# Patient Record
Sex: MALE | Race: WHITE | HISPANIC OR LATINO | Employment: FULL TIME | URBAN - METROPOLITAN AREA
[De-identification: names, ages, dates, MRNs, and addresses within clinical notes are randomized per-mention and may not be internally consistent; named-entity substitution may affect disease eponyms.]

---

## 2017-01-23 ENCOUNTER — ALLSCRIPTS OFFICE VISIT (OUTPATIENT)
Dept: OTHER | Facility: OTHER | Age: 52
End: 2017-01-23

## 2017-02-10 ENCOUNTER — ALLSCRIPTS OFFICE VISIT (OUTPATIENT)
Dept: OTHER | Facility: OTHER | Age: 52
End: 2017-02-10

## 2017-02-10 DIAGNOSIS — E11.9 TYPE 2 DIABETES MELLITUS WITHOUT COMPLICATIONS (HCC): ICD-10-CM

## 2017-02-10 LAB — HBA1C MFR BLD HPLC: 5.9 %

## 2017-02-15 ENCOUNTER — APPOINTMENT (OUTPATIENT)
Dept: LAB | Facility: HOSPITAL | Age: 52
End: 2017-02-15
Payer: COMMERCIAL

## 2017-02-15 DIAGNOSIS — E11.9 TYPE 2 DIABETES MELLITUS WITHOUT COMPLICATIONS (HCC): ICD-10-CM

## 2017-02-15 LAB
ALBUMIN SERPL BCP-MCNC: 3.6 G/DL (ref 3.5–5)
ALP SERPL-CCNC: 68 U/L (ref 46–116)
ALT SERPL W P-5'-P-CCNC: 31 U/L (ref 12–78)
ANION GAP SERPL CALCULATED.3IONS-SCNC: 7 MMOL/L (ref 4–13)
AST SERPL W P-5'-P-CCNC: 19 U/L (ref 5–45)
BASOPHILS # BLD AUTO: 0.01 THOUSANDS/ΜL (ref 0–0.1)
BASOPHILS NFR BLD AUTO: 0 % (ref 0–1)
BILIRUB SERPL-MCNC: 0.32 MG/DL (ref 0.2–1)
BUN SERPL-MCNC: 16 MG/DL (ref 5–25)
CALCIUM SERPL-MCNC: 8.3 MG/DL (ref 8.3–10.1)
CHLORIDE SERPL-SCNC: 103 MMOL/L (ref 100–108)
CHOLEST SERPL-MCNC: 191 MG/DL (ref 50–200)
CO2 SERPL-SCNC: 30 MMOL/L (ref 21–32)
CREAT SERPL-MCNC: 0.85 MG/DL (ref 0.6–1.3)
CREAT UR-MCNC: 174 MG/DL
EOSINOPHIL # BLD AUTO: 0.17 THOUSAND/ΜL (ref 0–0.61)
EOSINOPHIL NFR BLD AUTO: 3 % (ref 0–6)
ERYTHROCYTE [DISTWIDTH] IN BLOOD BY AUTOMATED COUNT: 13.2 % (ref 11.6–15.1)
GFR SERPL CREATININE-BSD FRML MDRD: >60 ML/MIN/1.73SQ M
GLUCOSE SERPL-MCNC: 129 MG/DL (ref 65–140)
HCT VFR BLD AUTO: 40.7 % (ref 36.5–49.3)
HDLC SERPL-MCNC: 43 MG/DL (ref 40–60)
HGB BLD-MCNC: 14.5 G/DL (ref 12–17)
LDLC SERPL CALC-MCNC: 125 MG/DL (ref 0–100)
LYMPHOCYTES # BLD AUTO: 1.77 THOUSANDS/ΜL (ref 0.6–4.47)
LYMPHOCYTES NFR BLD AUTO: 34 % (ref 14–44)
MCH RBC QN AUTO: 30.7 PG (ref 26.8–34.3)
MCHC RBC AUTO-ENTMCNC: 35.6 G/DL (ref 31.4–37.4)
MCV RBC AUTO: 86 FL (ref 82–98)
MICROALBUMIN UR-MCNC: 10.1 MG/L (ref 0–20)
MICROALBUMIN/CREAT 24H UR: 6 MG/G CREATININE (ref 0–30)
MONOCYTES # BLD AUTO: 0.42 THOUSAND/ΜL (ref 0.17–1.22)
MONOCYTES NFR BLD AUTO: 8 % (ref 4–12)
NEUTROPHILS # BLD AUTO: 2.92 THOUSANDS/ΜL (ref 1.85–7.62)
NEUTS SEG NFR BLD AUTO: 55 % (ref 43–75)
NRBC BLD AUTO-RTO: 0 /100 WBCS
PLATELET # BLD AUTO: 221 THOUSANDS/UL (ref 149–390)
PMV BLD AUTO: 11 FL (ref 8.9–12.7)
POTASSIUM SERPL-SCNC: 3.7 MMOL/L (ref 3.5–5.3)
PROT SERPL-MCNC: 7.3 G/DL (ref 6.4–8.2)
RBC # BLD AUTO: 4.72 MILLION/UL (ref 3.88–5.62)
SODIUM SERPL-SCNC: 140 MMOL/L (ref 136–145)
TRIGL SERPL-MCNC: 114 MG/DL
WBC # BLD AUTO: 5.29 THOUSAND/UL (ref 4.31–10.16)

## 2017-02-15 PROCEDURE — 80061 LIPID PANEL: CPT

## 2017-02-15 PROCEDURE — 82043 UR ALBUMIN QUANTITATIVE: CPT

## 2017-02-15 PROCEDURE — 80053 COMPREHEN METABOLIC PANEL: CPT

## 2017-02-15 PROCEDURE — 85025 COMPLETE CBC W/AUTO DIFF WBC: CPT

## 2017-02-15 PROCEDURE — 82570 ASSAY OF URINE CREATININE: CPT

## 2017-02-15 PROCEDURE — 36415 COLL VENOUS BLD VENIPUNCTURE: CPT

## 2017-09-21 ENCOUNTER — ALLSCRIPTS OFFICE VISIT (OUTPATIENT)
Dept: OTHER | Facility: OTHER | Age: 52
End: 2017-09-21

## 2017-09-21 DIAGNOSIS — E11.9 TYPE 2 DIABETES MELLITUS WITHOUT COMPLICATIONS (HCC): ICD-10-CM

## 2017-09-21 DIAGNOSIS — Z12.5 ENCOUNTER FOR SCREENING FOR MALIGNANT NEOPLASM OF PROSTATE: ICD-10-CM

## 2017-09-21 LAB — HBA1C MFR BLD HPLC: 6.1 %

## 2017-09-28 ENCOUNTER — APPOINTMENT (OUTPATIENT)
Dept: LAB | Facility: HOSPITAL | Age: 52
End: 2017-09-28
Payer: COMMERCIAL

## 2017-09-28 DIAGNOSIS — Z12.5 ENCOUNTER FOR SCREENING FOR MALIGNANT NEOPLASM OF PROSTATE: ICD-10-CM

## 2017-09-28 DIAGNOSIS — E11.9 TYPE 2 DIABETES MELLITUS WITHOUT COMPLICATIONS (HCC): ICD-10-CM

## 2017-09-28 LAB
ALBUMIN SERPL BCP-MCNC: 3.7 G/DL (ref 3.5–5)
ALP SERPL-CCNC: 59 U/L (ref 46–116)
ALT SERPL W P-5'-P-CCNC: 31 U/L (ref 12–78)
ANION GAP SERPL CALCULATED.3IONS-SCNC: 5 MMOL/L (ref 4–13)
AST SERPL W P-5'-P-CCNC: 20 U/L (ref 5–45)
BASOPHILS # BLD AUTO: 0.02 THOUSANDS/ΜL (ref 0–0.1)
BASOPHILS NFR BLD AUTO: 0 % (ref 0–1)
BILIRUB SERPL-MCNC: 0.4 MG/DL (ref 0.2–1)
BUN SERPL-MCNC: 13 MG/DL (ref 5–25)
CALCIUM SERPL-MCNC: 9 MG/DL (ref 8.3–10.1)
CHLORIDE SERPL-SCNC: 104 MMOL/L (ref 100–108)
CHOLEST SERPL-MCNC: 114 MG/DL (ref 50–200)
CO2 SERPL-SCNC: 31 MMOL/L (ref 21–32)
CREAT SERPL-MCNC: 0.86 MG/DL (ref 0.6–1.3)
EOSINOPHIL # BLD AUTO: 0.17 THOUSAND/ΜL (ref 0–0.61)
EOSINOPHIL NFR BLD AUTO: 4 % (ref 0–6)
ERYTHROCYTE [DISTWIDTH] IN BLOOD BY AUTOMATED COUNT: 13.3 % (ref 11.6–15.1)
GFR SERPL CREATININE-BSD FRML MDRD: 100 ML/MIN/1.73SQ M
GLUCOSE P FAST SERPL-MCNC: 129 MG/DL (ref 65–99)
HCT VFR BLD AUTO: 40.4 % (ref 36.5–49.3)
HDLC SERPL-MCNC: 36 MG/DL (ref 40–60)
HGB BLD-MCNC: 14 G/DL (ref 12–17)
LDLC SERPL CALC-MCNC: 55 MG/DL (ref 0–100)
LYMPHOCYTES # BLD AUTO: 1.23 THOUSANDS/ΜL (ref 0.6–4.47)
LYMPHOCYTES NFR BLD AUTO: 25 % (ref 14–44)
MCH RBC QN AUTO: 29.8 PG (ref 26.8–34.3)
MCHC RBC AUTO-ENTMCNC: 34.7 G/DL (ref 31.4–37.4)
MCV RBC AUTO: 86 FL (ref 82–98)
MONOCYTES # BLD AUTO: 0.47 THOUSAND/ΜL (ref 0.17–1.22)
MONOCYTES NFR BLD AUTO: 10 % (ref 4–12)
NEUTROPHILS # BLD AUTO: 2.95 THOUSANDS/ΜL (ref 1.85–7.62)
NEUTS SEG NFR BLD AUTO: 61 % (ref 43–75)
NRBC BLD AUTO-RTO: 0 /100 WBCS
PLATELET # BLD AUTO: 206 THOUSANDS/UL (ref 149–390)
PMV BLD AUTO: 10.5 FL (ref 8.9–12.7)
POTASSIUM SERPL-SCNC: 4.3 MMOL/L (ref 3.5–5.3)
PROT SERPL-MCNC: 7.3 G/DL (ref 6.4–8.2)
PSA SERPL-MCNC: 1.1 NG/ML (ref 0–4)
RBC # BLD AUTO: 4.7 MILLION/UL (ref 3.88–5.62)
SODIUM SERPL-SCNC: 140 MMOL/L (ref 136–145)
TRIGL SERPL-MCNC: 113 MG/DL
WBC # BLD AUTO: 4.84 THOUSAND/UL (ref 4.31–10.16)

## 2017-09-28 PROCEDURE — 80053 COMPREHEN METABOLIC PANEL: CPT

## 2017-09-28 PROCEDURE — 80061 LIPID PANEL: CPT

## 2017-09-28 PROCEDURE — 36415 COLL VENOUS BLD VENIPUNCTURE: CPT

## 2017-09-28 PROCEDURE — 85025 COMPLETE CBC W/AUTO DIFF WBC: CPT

## 2017-09-28 PROCEDURE — G0103 PSA SCREENING: HCPCS

## 2018-01-10 NOTE — MISCELLANEOUS
Provider Comments  Provider Comments:   Patient did not show for his 9:20 appt today        Signatures   Electronically signed by : RODY Sherman; Feb  3 2016 10:32AM EST                          Electronically signed by : RICHIE Loo ; Feb  3 2016 11:39AM EST                       (Author)

## 2018-01-12 NOTE — RESULT NOTES
Verified Results  US GUIDED THYROID BIOPSY [id number] 09:57AM Alvarez Humberto     Test Name Result Flag Reference   US GUIDED THYROID BIOPSY (Report)     ULTRASOUND-GUIDED THYROID BIOPSY     HISTORY: 46year-old with history of strong family history of thyroid cancer  Patient presents with prescription for biopsy of left thyroid nodule  COMPARISON: 9/27/2016     FINDINGS:      Prior ultrasound images were reviewed  Left thyroid lower pole nodule was targeted for today's biopsy  The procedure was explained to the patient, including risks of hemorrhage, infection and local injury  Informed consent was freely obtained  The patient verbalized expressed understanding of the above risks and wished to proceed with the procedure  Final standard time-out procedure performed  PROCEDURE: The neck was prepped and draped in normal sterile fashion  Under real-time ultrasound guidance and local anesthesia 3 passes with a 25 gauge needle were made through the left lower pole nodule measuring today 1 3 x 1 1 x 1 2 cm  Cytopathology   was present and deemed these initial passes inadequate, therefore 3 additional passes with a 25-gauge needle were performed  The patient tolerated the procedure well  Postprocedure instructions were provided for the patient  I asked the patient to call us with any questions, concerns, or acute problems  The patient expressed understanding of the above  IMPRESSION:     Status post successful ultrasound-guided thyroid biopsy  Procedure was performed by DARREL Gary PA-C under the direct supervision of Dr Zeb Bustos           PERFORMED, DICTATED AND SIGNED BY: [address] [place] [address]       Workstation performed: [id number]     Signed by:   Marie Poe MD   10/19/16

## 2018-01-13 VITALS
HEART RATE: 86 BPM | DIASTOLIC BLOOD PRESSURE: 72 MMHG | RESPIRATION RATE: 18 BRPM | OXYGEN SATURATION: 99 % | BODY MASS INDEX: 21.92 KG/M2 | TEMPERATURE: 98 F | SYSTOLIC BLOOD PRESSURE: 124 MMHG | HEIGHT: 69 IN | WEIGHT: 148 LBS

## 2018-01-14 VITALS
HEART RATE: 78 BPM | WEIGHT: 143 LBS | HEIGHT: 69 IN | OXYGEN SATURATION: 99 % | RESPIRATION RATE: 18 BRPM | SYSTOLIC BLOOD PRESSURE: 118 MMHG | TEMPERATURE: 96.2 F | DIASTOLIC BLOOD PRESSURE: 68 MMHG | BODY MASS INDEX: 21.18 KG/M2

## 2018-01-15 NOTE — RESULT NOTES
Verified Results  US PATHOLOGY REPORT (NO CHARGE) [id number] 09:57AM Beny Clement     Test Name Result Flag Reference   US PATHOLOGY REPORT (NO CHARGE) (Report)     Dear Dr Dottie Holder: Thank you for referring the above named patient for ultrasound-guided fine needle aspiration biopsy of the thyroid  I have reviewed the cytology report provided by Dr Jean Villalba  You should have received a copy of the report, but if not, you may    access the report through the [de-identified] Web Portal or by contacting the lab office at [phone]  The biopsy samples are reported as nondiagnostic, cyst fluid only  The following is an excerpt from the cytology report; left lower lobe, nondiagnostic (Spencer category 1), cyst fluid only  These results are concordant with imaging  Continued follow-up with ultrasound recommended  If there are any questions or concerns, please do not hesitate to contact me at [phone]       Sincerely,     Ethan Nvest       Workstation performed: [id number]

## 2018-01-18 NOTE — MISCELLANEOUS
Provider Comments  Provider Comments:   Pt no shoed for his appt at 1 pm      Signatures   Electronically signed by : RODY Magana; Jan 23 2017  4:19PM EST

## 2018-03-22 ENCOUNTER — TRANSCRIBE ORDERS (OUTPATIENT)
Dept: LAB | Facility: CLINIC | Age: 53
End: 2018-03-22

## 2018-03-22 ENCOUNTER — APPOINTMENT (OUTPATIENT)
Dept: LAB | Facility: CLINIC | Age: 53
End: 2018-03-22
Payer: COMMERCIAL

## 2018-03-22 ENCOUNTER — OFFICE VISIT (OUTPATIENT)
Dept: FAMILY MEDICINE CLINIC | Facility: CLINIC | Age: 53
End: 2018-03-22
Payer: COMMERCIAL

## 2018-03-22 VITALS
HEART RATE: 70 BPM | DIASTOLIC BLOOD PRESSURE: 70 MMHG | OXYGEN SATURATION: 98 % | RESPIRATION RATE: 18 BRPM | BODY MASS INDEX: 22.78 KG/M2 | SYSTOLIC BLOOD PRESSURE: 128 MMHG | HEIGHT: 68 IN | WEIGHT: 150.3 LBS | TEMPERATURE: 97.6 F

## 2018-03-22 DIAGNOSIS — R73.09 ABNORMAL BLOOD SUGAR: ICD-10-CM

## 2018-03-22 DIAGNOSIS — K64.4 EXTERNAL HEMORRHOIDS: ICD-10-CM

## 2018-03-22 DIAGNOSIS — E04.1 CYSTIC THYROID NODULE: ICD-10-CM

## 2018-03-22 DIAGNOSIS — R42 DIZZINESS: ICD-10-CM

## 2018-03-22 DIAGNOSIS — J01.90 ACUTE SINUSITIS, RECURRENCE NOT SPECIFIED, UNSPECIFIED LOCATION: ICD-10-CM

## 2018-03-22 DIAGNOSIS — R73.09 OTHER ABNORMAL GLUCOSE: Primary | ICD-10-CM

## 2018-03-22 DIAGNOSIS — E78.5 HYPERLIPIDEMIA, UNSPECIFIED HYPERLIPIDEMIA TYPE: ICD-10-CM

## 2018-03-22 DIAGNOSIS — Z12.11 ENCOUNTER FOR SCREENING COLONOSCOPY: ICD-10-CM

## 2018-03-22 DIAGNOSIS — E04.1 CYSTIC THYROID NODULE: Primary | ICD-10-CM

## 2018-03-22 DIAGNOSIS — E08.9: ICD-10-CM

## 2018-03-22 DIAGNOSIS — H61.23 BILATERAL IMPACTED CERUMEN: ICD-10-CM

## 2018-03-22 LAB
ALBUMIN SERPL BCP-MCNC: 3.5 G/DL (ref 3.5–5)
ALP SERPL-CCNC: 82 U/L (ref 46–116)
ALT SERPL W P-5'-P-CCNC: 18 U/L (ref 12–78)
ANION GAP SERPL CALCULATED.3IONS-SCNC: 5 MMOL/L (ref 4–13)
AST SERPL W P-5'-P-CCNC: 12 U/L (ref 5–45)
BASOPHILS # BLD AUTO: 0 THOUSANDS/ΜL (ref 0–0.1)
BASOPHILS NFR BLD AUTO: 0 % (ref 0–1)
BILIRUB SERPL-MCNC: 0.39 MG/DL (ref 0.2–1)
BUN SERPL-MCNC: 10 MG/DL (ref 5–25)
CALCIUM SERPL-MCNC: 8.3 MG/DL (ref 8.3–10.1)
CHLORIDE SERPL-SCNC: 107 MMOL/L (ref 100–108)
CHOLEST SERPL-MCNC: 148 MG/DL (ref 50–200)
CO2 SERPL-SCNC: 30 MMOL/L (ref 21–32)
CREAT SERPL-MCNC: 0.85 MG/DL (ref 0.6–1.3)
EOSINOPHIL # BLD AUTO: 0.13 THOUSAND/ΜL (ref 0–0.61)
EOSINOPHIL NFR BLD AUTO: 3 % (ref 0–6)
ERYTHROCYTE [DISTWIDTH] IN BLOOD BY AUTOMATED COUNT: 13.1 % (ref 11.6–15.1)
EST. AVERAGE GLUCOSE BLD GHB EST-MCNC: 154 MG/DL
GFR SERPL CREATININE-BSD FRML MDRD: 100 ML/MIN/1.73SQ M
GLUCOSE P FAST SERPL-MCNC: 118 MG/DL (ref 65–99)
HBA1C MFR BLD: 7 % (ref 4.2–6.3)
HCT VFR BLD AUTO: 40.3 % (ref 36.5–49.3)
HDLC SERPL-MCNC: 30 MG/DL (ref 40–60)
HGB BLD-MCNC: 13.9 G/DL (ref 12–17)
LDLC SERPL CALC-MCNC: 96 MG/DL (ref 0–100)
LYMPHOCYTES # BLD AUTO: 1 THOUSANDS/ΜL (ref 0.6–4.47)
LYMPHOCYTES NFR BLD AUTO: 26 % (ref 14–44)
MCH RBC QN AUTO: 29.1 PG (ref 26.8–34.3)
MCHC RBC AUTO-ENTMCNC: 34.5 G/DL (ref 31.4–37.4)
MCV RBC AUTO: 85 FL (ref 82–98)
MONOCYTES # BLD AUTO: 0.32 THOUSAND/ΜL (ref 0.17–1.22)
MONOCYTES NFR BLD AUTO: 8 % (ref 4–12)
NEUTROPHILS # BLD AUTO: 2.33 THOUSANDS/ΜL (ref 1.85–7.62)
NEUTS SEG NFR BLD AUTO: 63 % (ref 43–75)
NRBC BLD AUTO-RTO: 0 /100 WBCS
PLATELET # BLD AUTO: 295 THOUSANDS/UL (ref 149–390)
PMV BLD AUTO: 10.3 FL (ref 8.9–12.7)
POTASSIUM SERPL-SCNC: 3.9 MMOL/L (ref 3.5–5.3)
PROT SERPL-MCNC: 7.9 G/DL (ref 6.4–8.2)
RBC # BLD AUTO: 4.77 MILLION/UL (ref 3.88–5.62)
SODIUM SERPL-SCNC: 142 MMOL/L (ref 136–145)
TRIGL SERPL-MCNC: 110 MG/DL
TSH SERPL DL<=0.05 MIU/L-ACNC: 1.32 UIU/ML (ref 0.36–3.74)
WBC # BLD AUTO: 3.79 THOUSAND/UL (ref 4.31–10.16)

## 2018-03-22 PROCEDURE — 80053 COMPREHEN METABOLIC PANEL: CPT

## 2018-03-22 PROCEDURE — 83036 HEMOGLOBIN GLYCOSYLATED A1C: CPT

## 2018-03-22 PROCEDURE — 85025 COMPLETE CBC W/AUTO DIFF WBC: CPT

## 2018-03-22 PROCEDURE — 69209 REMOVE IMPACTED EAR WAX UNI: CPT | Performed by: PHYSICIAN ASSISTANT

## 2018-03-22 PROCEDURE — 36415 COLL VENOUS BLD VENIPUNCTURE: CPT

## 2018-03-22 PROCEDURE — 3008F BODY MASS INDEX DOCD: CPT | Performed by: PHYSICIAN ASSISTANT

## 2018-03-22 PROCEDURE — 80061 LIPID PANEL: CPT

## 2018-03-22 PROCEDURE — 84443 ASSAY THYROID STIM HORMONE: CPT

## 2018-03-22 PROCEDURE — 99214 OFFICE O/P EST MOD 30 MIN: CPT | Performed by: PHYSICIAN ASSISTANT

## 2018-03-22 RX ORDER — AZITHROMYCIN 250 MG/1
TABLET, FILM COATED ORAL
Qty: 6 TABLET | Refills: 0 | Status: SHIPPED | OUTPATIENT
Start: 2018-03-22 | End: 2018-03-25

## 2018-03-22 RX ORDER — PROMETHAZINE HYDROCHLORIDE AND CODEINE PHOSPHATE 6.25; 1 MG/5ML; MG/5ML
5 SYRUP ORAL EVERY 6 HOURS PRN
Qty: 120 ML | Refills: 0 | Status: ON HOLD | OUTPATIENT
Start: 2018-03-22 | End: 2018-05-08

## 2018-03-22 NOTE — PATIENT INSTRUCTIONS
Sinusitis   LO QUE NECESITA SABER:   ¿Qué es la sinusitis? La sinusitis es la inflamación o infección de los senos paranasales  La mayoría de las veces es a causa de un virus  La sinusitis aguda podría durar hasta 12 semanas  La sinusitis crónica dura más de 12 semanas  La sinusitis recurrente significa que la padece 4 o más veces en 1 año  ¿Qué aumenta mi riesgo de tener sinusitis? · Las condiciones de dajuan, pattie las infecciones de las vías respiratorias superiores, las [place], el asma o la fibrosis quística    · Las infecciones dentales o procedimientos, tales pattie las infecciones de encías, caries o un conducto radicular    · Fumar    · Las estructuras anormales de los senos paranasales, pattie bultos en la nariz, inflamación de las amígdalas o desviación del tabique nasal    · Un sistema inmunológico débil debido a las enfermedades pattie la diabetes o el VIH  ¿Cuáles son los signos y síntomas de la sinusitis? · [place] o escalofríos    · Dolor, presión, enrojecimiento o inflamación alrededor de la frente, las mejillas o los ojos    · Secreción espesa de color amarillo o josy que sale de la nariz    · Sensibilidad al tocarse el rama encima de los senos paranasales    · Tos seca que ocurre mayormente por la noche o al [place]    · Dolor de [place] y en el rama que empeora al inclinarse hacia adelante    · Dolor en los dientes o al masticar  ¿Cómo se diagnostica la sinusitis? Frazier médico lo examinará y le hará preguntas acerca de connie síntomas  Usará un espéculo nasal para revisar dentro de frazier nariz  Un espéculo es un instrumento pequeño que se [place] para abrir las fosas nasales  Es posible que [facility] de mucosidad de frazier nariz muestre qué germen está provocando frazier infección  ¿Cómo se trata la sinusitis? Connie síntomas podrían desaparecer por [employer]  Frazier médico puede recomendar clayton espera atenta hasta 10 días antes de iniciar el tratamiento con antibióticos   Es posible que usted necesite alguno de los siguientes:  · Acetaminofeno:  gustavo el dolor y baja la fiebre  Está disponible sin receta médica  Pregunte la cantidad y la frecuencia con que debe tomarlos  [address]  Vicki las etiquetas de todos los demás medicamentos que esté usando para saber si también contienen acetaminofén, o pregunte a frazier médico o farmacéutico  El acetaminofén puede causar daño en el hígado cuando no se dre de forma correcta  No use más de 4 gramos (4000 miligramos) en total de acetaminofeno en un día  · AINEs (Analgésicos antiinflamatorios no esteroides) pattie el ibuprofeno, ayudan a disminuir la inflamación, el dolor y la [place]  Irene medicamento esta disponible con o sin clayton receta médica  Los AINEs pueden causar sangrado estomacal o problemas renales en ciertas personas  Si usted dre un medicamento anticoagulante, siempre pregúntele a frazier médico si los PAKO son seguros para usted  Siempre vicki la etiqueta de irene medicamento y [place] instrucciones  · Los aerosoles nasales con esteroides  podrían ayudar a disminuir la inflamación de frazier nariz y senos paranasales  · Los descongestionantes  ayudan a reducir la inflamación y a drenar la mucosidad en la nariz y los senos paranasales  Los descongestionantes podrían ayudarle a respirar más fácilmente  · Los antihistamínicos  ayudan a secar la mucosidad en frazier [place] y a aliviar los estornudos  · Antibióticos  ayudan a tratar o prevenir infecciones bacteriales  ¿Cómo puedo controlar los síntomas? · Enjuague luis carlos senos paranasales  Use un aparato para enjuagar luis carlos fosas nasales con clayton solución salina (agua salada) o agua destilada  No use agua de la llave  [place] ayudará a diluir la mucosidad en la nariz eliminando el polen y la suciedad  También ayudará a reducir la inflamación para que usted pueda respirar normalmente  Pregunte a frazier médico con qué frecuencia hacerlo  · Respire vapor  [place] agua en un sartén hasta que salga vapor   Chidi Alford cuenco y sulema clayton carpa con clayton toalla janis  Respire profundamente por aproximadamente 20 minutos  Tenga cuidado de no acercarse demasiado al vapor o de quemarse  Sulema esto 3 veces al día  Usted también puede respirar profundamente mientras dre clayton ducha caliente  · Duerma con la [place]  [place] clayton almohada adicional debajo de frazier kathy antes de dormir para ayudar a drenar connie senos paranasales  · [address] se le haya indicado  Pregunte a frazier médico sobre la cantidad de líquido que necesita maddi todos los días y cuáles le recomienda  Los líquidos van a diluir la mucosidad en frazier [place] y [facility] a drenarla  Evite las bebidas que contienen alcohol o cafeína  · No fume y evite el humo de [place]  La nicotina y otros químicos en los cigarrillos y cigarros pueden empeorar connie síntomas  Pida información a frazier médico si usted actualmente fuma y necesita ayuda para dejar de fumar  Los cigarrillos electrónicos o tabaco sin humo todavía contienen nicotina  Consulte con frazier médico antes de [employer]  ¿Cómo puedo ayudar a evitar el contagio de los gérmenes que provocan la sinusitis? Lávese las arun frecuentemente con agua y [place]  [employer] las arun después de usar el baño, cambiarle el pañal a un wilbur o estornudar  Lávese las arun antes de comer o preparar alimentos  ¿Cuándo dayanna buscar atención inmediata? · Frazier ayala y párpado están enrojecidos, inflamados y adoloridos  · Usted no puede abrir frazeir ayala  · Usted tiene cambios en la visión, pattie visión doble  · Frazier globo ocular sobresale o usted no puede  frazier ayala  · Usted tiene más sueño de lo normal o nota cambios en frazier habilidad de pensar, moverse o hablar  · Usted tiene el mahesh rígido, fiebre o un arely dolor de kathy  · Usted tiene inflamada la frente o el cuero cabelludo  ¿Cuándo dayanna comunicarme con mi médico?   · Connie síntomas no mejoran después de 3 días      · Connie síntomas no desaparecen después de 10 días  · Usted tiene náuseas y vómitos  · Le sangra la [place]  · Usted tiene preguntas o inquietudes acerca de noe condición o cuidado  ACUERDOS SOBRE NOE CUIDADO:   Usted tiene el derecho de ayudar a planear noe cuidado  Aprenda todo lo que pueda sobre noe condición y pattie darle tratamiento  Discuta luis carlos opciones de tratamiento con luis carlos médicos para decidir el cuidado que usted desea recibir  Usted siempre tiene el derecho de rechazar el tratamiento  Esta información es sólo para uso en educación  Noe intención no es darle un consejo médico sobre enfermedades o tratamientos  Colsulte con noe Classie Esteves farmacéutico antes de seguir cualquier régimen médico para saber si es seguro y efectivo para usted  © 2017 [address]  Information is for End User's use only and may not be sold, redistributed or otherwise used for commercial purposes  All illustrations and images included in CareNotes® are the copyrighted property of [employer]  or Job Garcia

## 2018-03-22 NOTE — PROGRESS NOTES
Assessment/Plan:    No problem-specific Assessment & Plan notes found for this encounter  Problem List Items Addressed This Visit        Digestive    External hemorrhoids    Relevant Orders    Ambulatory referral to Colorectal Surgery       Endocrine    Cystic thyroid nodule - Primary    Relevant Orders    TSH, 3rd generation with T4 reflex       Other    Hyperlipidemia    Relevant Orders    CBC and differential    Comprehensive metabolic panel    Lipid panel    Abnormal blood sugar    Relevant Orders    Hemoglobin A1c      Other Visit Diagnoses     Dizziness        Relevant Orders    CBC and differential    Comprehensive metabolic panel    TSH, 3rd generation with T4 reflex    Encounter for screening colonoscopy        Relevant Orders    Ambulatory referral to Colorectal Surgery    Acute sinusitis, recurrence not specified, unspecified location        Relevant Medications    azithromycin (ZITHROMAX) 250 mg tablet    promethazine-codeine (PHENERGAN WITH CODEINE) 6 25-10 mg/5 mL syrup            Subjective:      Patient ID: Mouna Montilla is a 46 y o  male  URI    This is a new problem  The current episode started 1 to 4 weeks ago (about 7 days)  Maximum temperature: tactile fever  Associated symptoms include congestion, coughing, diarrhea (mild loose BM), headaches (improving  ), nausea, a plugged ear sensation, rhinorrhea, sinus pain and a sore throat  Pertinent negatives include no abdominal pain, chest pain, ear pain or vomiting  He has tried antihistamine, decongestant and acetaminophen for the symptoms  The treatment provided no relief  He was having dizziness for about 1 month  It is worse in the AM and he feels like everything is spinning  No nausea or vomiting  Ear symptoms just began now  He has to close his eyes to help with dizziness  It will typically lasts for minutes  Here for sick visit today     The following portions of the patient's history were reviewed and updated as appropriate:   He  has a past medical history of Closed fracture of fibula; Dental disorder; and Fracture of ankle  He   Patient Active Problem List    Diagnosis Date Noted    Abnormal blood sugar 03/22/2018    Cystic thyroid nodule 10/06/2016    Hyperlipidemia 04/03/2014    External hemorrhoids 08/27/2013     He  has a past surgical history that includes Knee surgery  His family history includes Thyroid cancer in his maternal grandmother, maternal uncle, and sister  He  reports that he has never smoked  He has never used smokeless tobacco  He reports that he does not drink alcohol or use drugs  Current Outpatient Prescriptions   Medication Sig Dispense Refill    azithromycin (ZITHROMAX) 250 mg tablet Take 2 pills on 1st day then 1 pill every day for next 4 days 6 tablet 0    promethazine-codeine (PHENERGAN WITH CODEINE) 6 25-10 mg/5 mL syrup Take 5 mL by mouth every 6 (six) hours as needed for cough 120 mL 0     No current facility-administered medications for this visit  No current outpatient prescriptions on file prior to visit  No current facility-administered medications on file prior to visit  He has No Known Allergies       Review of Systems   Constitutional: Positive for appetite change, fatigue and fever  Negative for chills and unexpected weight change  HENT: Positive for congestion, rhinorrhea, sinus pain and sore throat  Negative for ear discharge and ear pain  Respiratory: Positive for cough  Cardiovascular: Negative for chest pain  Gastrointestinal: Positive for diarrhea (mild loose BM) and nausea  Negative for abdominal pain and vomiting  Genitourinary: Negative for difficulty urinating  Neurological: Positive for dizziness and headaches (improving  )           Objective:      /70 (BP Location: Right arm, Patient Position: Sitting, Cuff Size: Standard)   Pulse 70   Temp 97 6 °F (36 4 °C) (Tympanic)   Resp 18   Ht 5' 8" (1 727 m)   Wt 68 2 kg (150 lb 4 8 oz)   SpO2 98%   BMI 22 85 kg/m²          Physical Exam   Constitutional: He is oriented to person, place, and time  He appears well-developed and well-nourished  No distress  HENT:   Head: Normocephalic and atraumatic  Right Ear: External ear normal    Left Ear: External ear normal    Nose: Nose normal    Mouth/Throat: Oropharynx is clear and moist  No oropharyngeal exudate  Cerumen impaction B   Eyes: Conjunctivae are normal    Neck: Normal range of motion  Neck supple  No thyromegaly present  Cardiovascular: Normal rate, regular rhythm and normal heart sounds  No murmur heard  Pulmonary/Chest: Breath sounds normal  No respiratory distress  He has no wheezes  Lymphadenopathy:     He has no cervical adenopathy  Neurological: He is alert and oriented to person, place, and time  Psychiatric: He has a normal mood and affect  His behavior is normal    Nursing note and vitals reviewed  Ear cerumen removal  Date/Time: 3/22/2018 11:01 AM  Performed by: Daryl Camargo  Authorized by: Daryl Camarog     Patient location:  Bedside  Other Assisting Provider: Yes (comment) (NIRMAL Truong)    Consent:     Consent obtained:  Verbal    Consent given by:  Patient    Risks discussed:  Incomplete removal  Procedure details:     Location:  L ear, R ear and external auditory canal    Procedure type: irrigation      Approach:  Natural orifice  Post-procedure details:     Complication:  None    Hearing quality:  Improved    Patient tolerance of procedure:   Tolerated well, no immediate complications

## 2018-03-23 NOTE — PROGRESS NOTES
A1c was at 7 which is consistent with diabetes  He has been intermittently diabetic in the past  I recommend we restart him on metformin and he needs to make sure he is eating a diabetic diet  This is likely reason for his dizziness   F/u in 3 months

## 2018-04-02 DIAGNOSIS — E08.9: ICD-10-CM

## 2018-05-08 ENCOUNTER — ANESTHESIA (OUTPATIENT)
Dept: GASTROENTEROLOGY | Facility: HOSPITAL | Age: 53
End: 2018-05-08
Payer: COMMERCIAL

## 2018-05-08 ENCOUNTER — HOSPITAL ENCOUNTER (OUTPATIENT)
Facility: HOSPITAL | Age: 53
Setting detail: OUTPATIENT SURGERY
Discharge: HOME | End: 2018-05-08
Attending: COLON & RECTAL SURGERY | Admitting: COLON & RECTAL SURGERY
Payer: COMMERCIAL

## 2018-05-08 ENCOUNTER — ANESTHESIA EVENT (OUTPATIENT)
Dept: GASTROENTEROLOGY | Facility: HOSPITAL | Age: 53
End: 2018-05-08
Payer: COMMERCIAL

## 2018-05-08 VITALS
DIASTOLIC BLOOD PRESSURE: 75 MMHG | HEIGHT: 68 IN | OXYGEN SATURATION: 100 % | BODY MASS INDEX: 28.49 KG/M2 | WEIGHT: 188 LBS | RESPIRATION RATE: 16 BRPM | HEART RATE: 63 BPM | SYSTOLIC BLOOD PRESSURE: 117 MMHG | TEMPERATURE: 97 F

## 2018-05-08 LAB — GLUCOSE SERPL-MCNC: 113 MG/DL (ref 65–140)

## 2018-05-08 PROCEDURE — 82948 REAGENT STRIP/BLOOD GLUCOSE: CPT

## 2018-05-08 RX ORDER — OXYCODONE HYDROCHLORIDE AND ACETAMINOPHEN 5; 325 MG/1; MG/1
1 TABLET ORAL EVERY 4 HOURS PRN
COMMUNITY
End: 2018-09-08

## 2018-05-08 RX ORDER — LIDOCAINE HYDROCHLORIDE 10 MG/ML
INJECTION, SOLUTION INFILTRATION; PERINEURAL AS NEEDED
Status: DISCONTINUED | OUTPATIENT
Start: 2018-05-08 | End: 2018-05-08 | Stop reason: SURG

## 2018-05-08 RX ORDER — SODIUM CHLORIDE 9 MG/ML
50 INJECTION, SOLUTION INTRAVENOUS CONTINUOUS
Status: DISCONTINUED | OUTPATIENT
Start: 2018-05-08 | End: 2018-05-08 | Stop reason: HOSPADM

## 2018-05-08 RX ORDER — PROPOFOL 10 MG/ML
INJECTION, EMULSION INTRAVENOUS AS NEEDED
Status: DISCONTINUED | OUTPATIENT
Start: 2018-05-08 | End: 2018-05-08 | Stop reason: SURG

## 2018-05-08 RX ADMIN — SODIUM CHLORIDE 50 ML/HR: 0.9 INJECTION, SOLUTION INTRAVENOUS at 08:49

## 2018-05-08 RX ADMIN — PROPOFOL 20 MG: 10 INJECTION, EMULSION INTRAVENOUS at 09:00

## 2018-05-08 RX ADMIN — LIDOCAINE HYDROCHLORIDE 50 MG: 10 INJECTION, SOLUTION INFILTRATION; PERINEURAL at 08:59

## 2018-05-08 RX ADMIN — PROPOFOL 20 MG: 10 INJECTION, EMULSION INTRAVENOUS at 09:06

## 2018-05-08 RX ADMIN — PROPOFOL 30 MG: 10 INJECTION, EMULSION INTRAVENOUS at 09:15

## 2018-05-08 RX ADMIN — PROPOFOL 30 MG: 10 INJECTION, EMULSION INTRAVENOUS at 09:09

## 2018-05-08 RX ADMIN — PROPOFOL 30 MG: 10 INJECTION, EMULSION INTRAVENOUS at 09:12

## 2018-05-08 RX ADMIN — PROPOFOL 100 MG: 10 INJECTION, EMULSION INTRAVENOUS at 08:59

## 2018-05-08 RX ADMIN — PROPOFOL 20 MG: 10 INJECTION, EMULSION INTRAVENOUS at 09:03

## 2018-05-08 NOTE — ANESTHESIA PREPROCEDURE EVALUATION
Review of Systems/Medical History  Patient summary reviewed  Chart reviewed      Cardiovascular  Exercise tolerance: good, Exercise comment: >4 mets w/o difficulty Hyperlipidemia, No hypertension , No CAD , No cardiac stents  No CHF ,    Pulmonary  Not a smoker , No asthma ,        GI/Hepatic            Endo/Other  Diabetes poorly controlled , History of thyroid disease ,      GYN       Hematology   Musculoskeletal       Neurology    No TIA, No CVA ,    Psychology         Lab Results   Component Value Date    WBC 3 79 (L) 03/22/2018    HGB 13 9 03/22/2018     03/22/2018     Lab Results   Component Value Date     03/22/2018    K 3 9 03/22/2018    BUN 10 03/22/2018    CREATININE 0 85 03/22/2018    GLUCOSE 129 02/15/2017     No results found for: PTT   No results found for: INR    Blood type CANCELED - Canc    Lab Results   Component Value Date    HGBA1C 7 0 (H) 03/22/2018       Physical Exam    Airway    Mallampati score: I  TM Distance: >3 FB       Dental       Cardiovascular      Pulmonary      Other Findings        Anesthesia Plan  ASA Score- 2     Anesthesia Type- IV sedation with anesthesia with ASA Monitors  Additional Monitors:   Airway Plan:     Comment: RICHIE Kenney , have personally seen and evaluated the patient prior to anesthetic care  I have reviewed the pre-anesthetic record, and other medical records if appropriate to the anesthetic care  If a CRNA is involved in the case, I have reviewed the CRNA assessment, if present, and agree  Risks/benefits and alternatives discussed with patient including possible PONV, sore throat, and possibility of rare anesthetic and surgical emergencies        Plan Factors-    Induction-     Postoperative Plan-     Informed Consent- Anesthetic plan and risks discussed with patient  I personally reviewed this patient with the CRNA  Discussed and agreed on the Anesthesia Plan with the CRNA  Marissa Diamond

## 2018-05-08 NOTE — ANESTHESIA PREPROCEDURE EVALUATION
Review of Systems/Medical History          Cardiovascular  Hyperlipidemia, No CAD , No cardiac stents     Pulmonary  Recent URI (flu resolved approx 1 mo ago) ,        GI/Hepatic            Endo/Other  Diabetes , History of thyroid disease ,      GYN       Hematology   Musculoskeletal       Neurology   Psychology           Physical Exam    Airway    Mallampati score: II  TM Distance: >3 FB       Dental       Cardiovascular      Pulmonary      Other Findings        Anesthesia Plan  ASA Score- 2     Anesthesia Type- IV sedation with anesthesia with ASA Monitors  Additional Monitors:   Airway Plan:     Comment: RICHIE Moncada , have personally seen and evaluated the patient prior to anesthetic care  I have reviewed the pre-anesthetic record, and other medical records if appropriate to the anesthetic care  If a CRNA is involved in the case, I have reviewed the CRNA assessment, if present, and agree  Risks/benefits and alternatives discussed with patient including possible PONV, sore throat, and possibility of rare anesthetic and surgical emergencies        Plan Factors-    Induction-     Postoperative Plan-     Informed Consent- Anesthetic plan and risks discussed with patient  I personally reviewed this patient with the CRNA  Discussed and agreed on the Anesthesia Plan with the CRNA  Steph Kam

## 2018-05-08 NOTE — ANESTHESIA POSTPROCEDURE EVALUATION
Post-Op Assessment Note      CV Status:  Stable    Mental Status:  Alert    Hydration Status:  Stable and euvolemic    PONV Controlled:  None    Airway Patency:  Patent    Post Op Vitals Reviewed: Yes          Staff: CRNA       Comments: vss          BP   119/74   Temp     Pulse  67   Resp   18   SpO2   99

## 2018-05-08 NOTE — OP NOTE
**** GI/ENDOSCOPY REPORT ****     PATIENT NAME: MRAK Avina ------ VISIT ID:  Patient ID:   [id number] YOB: 1965     INTRODUCTION: Colonoscopy - A 46 male patient presents for an outpatient   Colonoscopy at [address] [place] [address]  PREVIOUS COLONOSCOPY: Patient's last colonoscopy was more than 10 years   ago  INDICATIONS: Rectal bleeding  CONSENT:  The benefits, risks, and alternatives to the procedure were   discussed and informed consent was obtained from the patient  PREPARATION: EKG, pulse, pulse oximetry and blood pressure were monitored   throughout the procedure  The patient was identified by myself both   verbally and by visual inspection of ID band  MEDICATIONS: Anesthesia-check records     PROCEDURE:  The endoscope was passed without difficulty through the anus   under direct visualization and advanced to the cecum, confirmed by   appendiceal orifice and ileocecal valve  The scope was withdrawn and the   mucosa was carefully examined  The quality of the preparation was fair  Cecal Intubation Time: Minute(s) Scope Withdrawal Time: Minute(s)     RECTAL EXAM: Normal rectal exam      FINDINGS:  There was evidence of mild diverticulosis in the sigmoid colon  Otherwise, the colon appeared to be normal      COMPLICATIONS: There were no complications  IMPRESSIONS: Mild diverticulosis found in the sigmoid colon  RECOMMENDATIONS: Colonoscopy recommended in 10 years  patient will be sent   a reminder letter  Discharge home when standard parameters are met  Start   high fiber diet  Follow-up appointment in attending physician's office on   an as needed basis  ESTIMATED BLOOD LOSS: None       PATHOLOGY SPECIMENS: No     PROCEDURE CODES: Colonoscopy     ICD-9 Codes: 569 3 Hemorrhage of rectum and anus 562 10 Diverticulosis of   colon (without mention of hemorrhage)     ICD-10 Codes: K62 5 Hemorrhage of anus and rectum K57 Diverticular disease   of intestine     PERFORMED BY: RICHIE Cordoba  on 05/08/2018  Version 1, electronically signed by RICHIE Ma  on 05/08/2018   at 09:26

## 2018-09-08 ENCOUNTER — APPOINTMENT (INPATIENT)
Dept: RADIOLOGY | Facility: HOSPITAL | Age: 53
DRG: 670 | End: 2018-09-08
Payer: COMMERCIAL

## 2018-09-08 ENCOUNTER — HOSPITAL ENCOUNTER (INPATIENT)
Facility: HOSPITAL | Age: 53
LOS: 1 days | Discharge: HOME | DRG: 670 | End: 2018-09-08
Attending: EMERGENCY MEDICINE | Admitting: UROLOGY
Payer: COMMERCIAL

## 2018-09-08 ENCOUNTER — ANESTHESIA EVENT (INPATIENT)
Dept: PERIOP | Facility: HOSPITAL | Age: 53
DRG: 670 | End: 2018-09-08
Payer: COMMERCIAL

## 2018-09-08 ENCOUNTER — ANESTHESIA (INPATIENT)
Dept: PERIOP | Facility: HOSPITAL | Age: 53
DRG: 670 | End: 2018-09-08
Payer: COMMERCIAL

## 2018-09-08 ENCOUNTER — APPOINTMENT (EMERGENCY)
Dept: CT IMAGING | Facility: HOSPITAL | Age: 53
DRG: 670 | End: 2018-09-08
Payer: COMMERCIAL

## 2018-09-08 VITALS
TEMPERATURE: 97.4 F | WEIGHT: 150 LBS | HEART RATE: 61 BPM | SYSTOLIC BLOOD PRESSURE: 138 MMHG | RESPIRATION RATE: 18 BRPM | BODY MASS INDEX: 22.81 KG/M2 | DIASTOLIC BLOOD PRESSURE: 69 MMHG | OXYGEN SATURATION: 99 %

## 2018-09-08 DIAGNOSIS — N20.1 URETEROLITHIASIS: Primary | ICD-10-CM

## 2018-09-08 DIAGNOSIS — N20.1 URETERAL STONE: ICD-10-CM

## 2018-09-08 DIAGNOSIS — R52 PAIN: ICD-10-CM

## 2018-09-08 PROBLEM — N13.5 STRICTURE OF URETER: Status: ACTIVE | Noted: 2018-09-08

## 2018-09-08 PROBLEM — N35.919 URETHRAL STRICTURE: Status: ACTIVE | Noted: 2018-09-08

## 2018-09-08 LAB
BACTERIA UR QL AUTO: ABNORMAL /HPF
BILIRUB UR QL STRIP: NEGATIVE
CLARITY UR: CLEAR
COLOR UR: YELLOW
GLUCOSE SERPL-MCNC: 161 MG/DL (ref 65–140)
GLUCOSE UR STRIP-MCNC: ABNORMAL MG/DL
HGB UR QL STRIP.AUTO: ABNORMAL
KETONES UR STRIP-MCNC: NEGATIVE MG/DL
LEUKOCYTE ESTERASE UR QL STRIP: NEGATIVE
NITRITE UR QL STRIP: NEGATIVE
NON-SQ EPI CELLS URNS QL MICRO: ABNORMAL /HPF
PH UR STRIP.AUTO: 8.5 [PH] (ref 4.5–8)
PROT UR STRIP-MCNC: NEGATIVE MG/DL
RBC #/AREA URNS AUTO: ABNORMAL /HPF
SP GR UR STRIP.AUTO: 1.01 (ref 1–1.03)
UROBILINOGEN UR QL STRIP.AUTO: 0.2 E.U./DL
WBC #/AREA URNS AUTO: ABNORMAL /HPF

## 2018-09-08 PROCEDURE — 96375 TX/PRO/DX INJ NEW DRUG ADDON: CPT

## 2018-09-08 PROCEDURE — 0T778DZ DILATION OF LEFT URETER WITH INTRALUMINAL DEVICE, VIA NATURAL OR ARTIFICIAL OPENING ENDOSCOPIC: ICD-10-PCS | Performed by: UROLOGY

## 2018-09-08 PROCEDURE — 74176 CT ABD & PELVIS W/O CONTRAST: CPT

## 2018-09-08 PROCEDURE — 0TC78ZZ EXTIRPATION OF MATTER FROM LEFT URETER, VIA NATURAL OR ARTIFICIAL OPENING ENDOSCOPIC: ICD-10-PCS | Performed by: UROLOGY

## 2018-09-08 PROCEDURE — BT1F1ZZ FLUOROSCOPY OF LEFT KIDNEY, URETER AND BLADDER USING LOW OSMOLAR CONTRAST: ICD-10-PCS | Performed by: UROLOGY

## 2018-09-08 PROCEDURE — 52332 CYSTOSCOPY AND TREATMENT: CPT | Performed by: UROLOGY

## 2018-09-08 PROCEDURE — C1769 GUIDE WIRE: HCPCS | Performed by: UROLOGY

## 2018-09-08 PROCEDURE — 52352 CYSTOURETERO W/STONE REMOVE: CPT | Performed by: UROLOGY

## 2018-09-08 PROCEDURE — 52354 CYSTOURETERO W/BIOPSY: CPT | Performed by: UROLOGY

## 2018-09-08 PROCEDURE — 81001 URINALYSIS AUTO W/SCOPE: CPT

## 2018-09-08 PROCEDURE — 0T7D8ZZ DILATION OF URETHRA, VIA NATURAL OR ARTIFICIAL OPENING ENDOSCOPIC: ICD-10-PCS | Performed by: UROLOGY

## 2018-09-08 PROCEDURE — 82360 CALCULUS ASSAY QUANT: CPT | Performed by: UROLOGY

## 2018-09-08 PROCEDURE — C1726 CATH, BAL DIL, NON-VASCULAR: HCPCS | Performed by: UROLOGY

## 2018-09-08 PROCEDURE — 96376 TX/PRO/DX INJ SAME DRUG ADON: CPT

## 2018-09-08 PROCEDURE — 96374 THER/PROPH/DIAG INJ IV PUSH: CPT

## 2018-09-08 PROCEDURE — 99235 HOSP IP/OBS SAME DATE MOD 70: CPT | Performed by: UROLOGY

## 2018-09-08 PROCEDURE — 82948 REAGENT STRIP/BLOOD GLUCOSE: CPT

## 2018-09-08 PROCEDURE — C2617 STENT, NON-COR, TEM W/O DEL: HCPCS | Performed by: UROLOGY

## 2018-09-08 PROCEDURE — 76000 FLUOROSCOPY <1 HR PHYS/QHP: CPT

## 2018-09-08 PROCEDURE — 99285 EMERGENCY DEPT VISIT HI MDM: CPT

## 2018-09-08 PROCEDURE — 96361 HYDRATE IV INFUSION ADD-ON: CPT

## 2018-09-08 DEVICE — STENT CONTOUR INJ 6FR 30CM: Type: IMPLANTABLE DEVICE | Site: BLADDER | Status: FUNCTIONAL

## 2018-09-08 RX ORDER — ONDANSETRON 2 MG/ML
4 INJECTION INTRAMUSCULAR; INTRAVENOUS ONCE
Status: COMPLETED | OUTPATIENT
Start: 2018-09-08 | End: 2018-09-08

## 2018-09-08 RX ORDER — CEPHALEXIN 500 MG/1
500 CAPSULE ORAL EVERY 12 HOURS SCHEDULED
Qty: 10 CAPSULE | Refills: 0 | Status: SHIPPED | OUTPATIENT
Start: 2018-09-08 | End: 2018-09-13

## 2018-09-08 RX ORDER — FENTANYL CITRATE/PF 50 MCG/ML
25 SYRINGE (ML) INJECTION
Status: DISCONTINUED | OUTPATIENT
Start: 2018-09-08 | End: 2018-09-08 | Stop reason: HOSPADM

## 2018-09-08 RX ORDER — MORPHINE SULFATE 4 MG/ML
4 INJECTION, SOLUTION INTRAMUSCULAR; INTRAVENOUS EVERY 2 HOUR PRN
Status: DISCONTINUED | OUTPATIENT
Start: 2018-09-08 | End: 2018-09-08 | Stop reason: HOSPADM

## 2018-09-08 RX ORDER — OXYCODONE HYDROCHLORIDE AND ACETAMINOPHEN 5; 325 MG/1; MG/1
1 TABLET ORAL EVERY 4 HOURS PRN
Status: DISCONTINUED | OUTPATIENT
Start: 2018-09-08 | End: 2018-09-08 | Stop reason: HOSPADM

## 2018-09-08 RX ORDER — MAGNESIUM HYDROXIDE 1200 MG/15ML
LIQUID ORAL AS NEEDED
Status: DISCONTINUED | OUTPATIENT
Start: 2018-09-08 | End: 2018-09-08 | Stop reason: HOSPADM

## 2018-09-08 RX ORDER — ONDANSETRON 2 MG/ML
4 INJECTION INTRAMUSCULAR; INTRAVENOUS EVERY 6 HOURS PRN
Status: DISCONTINUED | OUTPATIENT
Start: 2018-09-08 | End: 2018-09-08 | Stop reason: HOSPADM

## 2018-09-08 RX ORDER — OXYBUTYNIN CHLORIDE 5 MG/1
5 TABLET ORAL 2 TIMES DAILY
Status: DISCONTINUED | OUTPATIENT
Start: 2018-09-08 | End: 2018-09-08 | Stop reason: HOSPADM

## 2018-09-08 RX ORDER — ONDANSETRON 2 MG/ML
INJECTION INTRAMUSCULAR; INTRAVENOUS AS NEEDED
Status: DISCONTINUED | OUTPATIENT
Start: 2018-09-08 | End: 2018-09-08 | Stop reason: SURG

## 2018-09-08 RX ORDER — FENTANYL CITRATE 50 UG/ML
INJECTION, SOLUTION INTRAMUSCULAR; INTRAVENOUS AS NEEDED
Status: DISCONTINUED | OUTPATIENT
Start: 2018-09-08 | End: 2018-09-08 | Stop reason: SURG

## 2018-09-08 RX ORDER — SODIUM CHLORIDE, SODIUM LACTATE, POTASSIUM CHLORIDE, CALCIUM CHLORIDE 600; 310; 30; 20 MG/100ML; MG/100ML; MG/100ML; MG/100ML
125 INJECTION, SOLUTION INTRAVENOUS CONTINUOUS
Status: DISCONTINUED | OUTPATIENT
Start: 2018-09-08 | End: 2018-09-08 | Stop reason: HOSPADM

## 2018-09-08 RX ORDER — KETOROLAC TROMETHAMINE 30 MG/ML
15 INJECTION, SOLUTION INTRAMUSCULAR; INTRAVENOUS ONCE
Status: COMPLETED | OUTPATIENT
Start: 2018-09-08 | End: 2018-09-08

## 2018-09-08 RX ORDER — OXYCODONE HYDROCHLORIDE AND ACETAMINOPHEN 5; 325 MG/1; MG/1
2 TABLET ORAL EVERY 4 HOURS PRN
Status: DISCONTINUED | OUTPATIENT
Start: 2018-09-08 | End: 2018-09-08 | Stop reason: HOSPADM

## 2018-09-08 RX ORDER — ONDANSETRON 2 MG/ML
4 INJECTION INTRAMUSCULAR; INTRAVENOUS ONCE AS NEEDED
Status: DISCONTINUED | OUTPATIENT
Start: 2018-09-08 | End: 2018-09-08 | Stop reason: HOSPADM

## 2018-09-08 RX ORDER — ACETAMINOPHEN 325 MG/1
650 TABLET ORAL EVERY 6 HOURS PRN
Status: DISCONTINUED | OUTPATIENT
Start: 2018-09-08 | End: 2018-09-08 | Stop reason: HOSPADM

## 2018-09-08 RX ORDER — OXYCODONE HYDROCHLORIDE AND ACETAMINOPHEN 5; 325 MG/1; MG/1
1 TABLET ORAL EVERY 4 HOURS PRN
Qty: 15 TABLET | Refills: 0 | Status: SHIPPED | OUTPATIENT
Start: 2018-09-08 | End: 2018-09-18

## 2018-09-08 RX ORDER — PROPOFOL 10 MG/ML
INJECTION, EMULSION INTRAVENOUS AS NEEDED
Status: DISCONTINUED | OUTPATIENT
Start: 2018-09-08 | End: 2018-09-08 | Stop reason: SURG

## 2018-09-08 RX ORDER — MORPHINE SULFATE 4 MG/ML
4 INJECTION, SOLUTION INTRAMUSCULAR; INTRAVENOUS ONCE
Status: COMPLETED | OUTPATIENT
Start: 2018-09-08 | End: 2018-09-08

## 2018-09-08 RX ORDER — MIDAZOLAM HYDROCHLORIDE 1 MG/ML
INJECTION INTRAMUSCULAR; INTRAVENOUS AS NEEDED
Status: DISCONTINUED | OUTPATIENT
Start: 2018-09-08 | End: 2018-09-08 | Stop reason: SURG

## 2018-09-08 RX ORDER — DEXAMETHASONE SODIUM PHOSPHATE 4 MG/ML
INJECTION, SOLUTION INTRA-ARTICULAR; INTRALESIONAL; INTRAMUSCULAR; INTRAVENOUS; SOFT TISSUE AS NEEDED
Status: DISCONTINUED | OUTPATIENT
Start: 2018-09-08 | End: 2018-09-08 | Stop reason: SURG

## 2018-09-08 RX ORDER — SODIUM CHLORIDE, SODIUM LACTATE, POTASSIUM CHLORIDE, CALCIUM CHLORIDE 600; 310; 30; 20 MG/100ML; MG/100ML; MG/100ML; MG/100ML
125 INJECTION, SOLUTION INTRAVENOUS CONTINUOUS
Status: CANCELLED | OUTPATIENT
Start: 2018-09-08

## 2018-09-08 RX ADMIN — SODIUM CHLORIDE 1000 ML: 0.9 INJECTION, SOLUTION INTRAVENOUS at 00:36

## 2018-09-08 RX ADMIN — SODIUM CHLORIDE, SODIUM LACTATE, POTASSIUM CHLORIDE, AND CALCIUM CHLORIDE 125 ML/HR: .6; .31; .03; .02 INJECTION, SOLUTION INTRAVENOUS at 05:49

## 2018-09-08 RX ADMIN — MIDAZOLAM 2 MG: 1 INJECTION INTRAMUSCULAR; INTRAVENOUS at 08:31

## 2018-09-08 RX ADMIN — OXYBUTYNIN CHLORIDE 5 MG: 5 TABLET ORAL at 13:30

## 2018-09-08 RX ADMIN — FENTANYL CITRATE 50 MCG: 50 INJECTION, SOLUTION INTRAMUSCULAR; INTRAVENOUS at 08:55

## 2018-09-08 RX ADMIN — LIDOCAINE HYDROCHLORIDE 100 MG: 20 INJECTION, SOLUTION INTRAVENOUS at 08:39

## 2018-09-08 RX ADMIN — CEFAZOLIN SODIUM 2000 MG: 2 SOLUTION INTRAVENOUS at 05:50

## 2018-09-08 RX ADMIN — SODIUM CHLORIDE 1000 ML: 0.9 INJECTION, SOLUTION INTRAVENOUS at 03:07

## 2018-09-08 RX ADMIN — PROPOFOL 200 MG: 10 INJECTION, EMULSION INTRAVENOUS at 08:40

## 2018-09-08 RX ADMIN — KETOROLAC TROMETHAMINE 15 MG: 30 INJECTION, SOLUTION INTRAMUSCULAR at 00:36

## 2018-09-08 RX ADMIN — ONDANSETRON 4 MG: 2 INJECTION INTRAMUSCULAR; INTRAVENOUS at 00:35

## 2018-09-08 RX ADMIN — MORPHINE SULFATE 4 MG: 4 INJECTION INTRAVENOUS at 05:46

## 2018-09-08 RX ADMIN — FENTANYL CITRATE 50 MCG: 50 INJECTION, SOLUTION INTRAMUSCULAR; INTRAVENOUS at 08:39

## 2018-09-08 RX ADMIN — DEXAMETHASONE SODIUM PHOSPHATE 4 MG: 4 INJECTION, SOLUTION INTRAMUSCULAR; INTRAVENOUS at 08:45

## 2018-09-08 RX ADMIN — CEFAZOLIN SODIUM 2000 MG: 2 SOLUTION INTRAVENOUS at 14:05

## 2018-09-08 RX ADMIN — ONDANSETRON HYDROCHLORIDE 4 MG: 2 INJECTION, SOLUTION INTRAVENOUS at 08:45

## 2018-09-08 RX ADMIN — ONDANSETRON 4 MG: 2 INJECTION INTRAMUSCULAR; INTRAVENOUS at 03:07

## 2018-09-08 RX ADMIN — MORPHINE SULFATE 4 MG: 4 INJECTION INTRAVENOUS at 03:09

## 2018-09-08 NOTE — ED NOTES
Patient requesting to stay in street clothes     [facility] [address] [place] [address]  09/08/18 [id number]
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
## 2018-09-08 NOTE — DISCHARGE SUMMARY
Discharge Summary - Ethan Chen 48 y o  male MRN: [id number]    Admission Date: 9/8/2018     Admitting Diagnosis: Ureterolithiasis [N20 1]  Flank pain [R10 9]    Procedures Performed:   Left ureteroscopy, stone removal, dilation of stricture    Patient underwent planned outpt surgery and recovered without complication  Discharged in good condition  Medications were prescribed  Pt knows to call the office for followup in for day

## 2018-09-08 NOTE — ANESTHESIA PREPROCEDURE EVALUATION
Review of Systems/Medical History  Patient summary reviewed  Chart reviewed  No history of anesthetic complications     Cardiovascular  Exercise tolerance (METS): >4,  Hyperlipidemia,    Pulmonary  Negative pulmonary ROS        GI/Hepatic  Negative GI/hepatic ROS          Kidney stones,        Endo/Other  Diabetes (HbA1c 7 0, AM glucose 113) well controlled type 2 Oral agent,      GYN       Hematology  Negative hematology ROS      Musculoskeletal  Negative musculoskeletal ROS        Neurology  Negative neurology ROS      Psychology   Negative psychology ROS              Physical Exam    Airway    Mallampati score: II  TM Distance: >3 FB  Neck ROM: full     Dental   No notable dental hx     Cardiovascular  Rhythm: regular, Rate: normal,     Pulmonary  Breath sounds clear to auscultation,     Other Findings        Anesthesia Plan  ASA Score- 2     Anesthesia Type- general with ASA Monitors  Additional Monitors:   Airway Plan: LMA  Plan Factors-Patient not instructed to abstain from smoking on day of procedure  Patient did not smoke on day of surgery  Induction- intravenous  Postoperative Plan- Plan for postoperative opioid use  Informed Consent- Anesthetic plan and risks discussed with patient

## 2018-09-08 NOTE — ED NOTES
Pt agreeable to have ride home d/t pain medication administration        Barb Singh RN  09/08/18 8250

## 2018-09-08 NOTE — OP NOTE
OPERATIVE REPORT  PATIENT NAME: Ronny Pittman    :  1965  MRN: [id number]  Pt Location: [facility] OR ROOM 01    SURGERY DATE: 2018    Surgeon(s) and Role:     * Ellen Vargas MD - Primary    Preop Diagnosis:  Ureterolithiasis [N20 1]    Post-Op Diagnosis Codes:     * Ureterolithiasis [N20 1]  Urethral stenosis    Procedure(s) (LRB):  CYSTOSCOPY URETEROSCOPY WITH BASKET STONE EXTRACTION,  URETHRAL DILATION, RETROGRADE PYELOGRAM AND INSERTION STENT URETERAL (Left)    Specimen(s):  ID Type Source Tests Collected by Time Destination   A : Left Ureteral Stone  Calculus Ureter, Left STONE ANALYSIS Ellen Vargas MD 2018 [id number]        Estimated Blood Loss:   Minimal    Drains:  Ureteral Drain/Stent Left ureter 6 Fr  (Active)   Number of days: 0       Anesthesia Type:   General/LMA    Operative Indications:  Ureterolithiasis [N20 1]      Operative Findings:  Stenotic urethral meatus, 4 mm stone distal left ureter, stricture of distal left ureter    Complications:   None    Procedure and Technique:      PLAN FOR STENT:  Remove  by nurse with string this coming Wednesday       The patient was brought into the OR, properly identified and positioned on the table  General anesthesia was induced, and he was prepped using betadine solution and draped in lithotomy position  The 22F scope was introduced in the urethra, which showed no strictures  The prostate had minimal hyperplasia  The bladder was inspected and had no lesions, stones, or tumors  The left ureteral orifice was identified and retrograde pyelogram performed, showing mild narrowing of the distal left ureter and a stone about 2 cm up from the bladder  A wire placed up the ureter  The rigid ureteroscope was introduced and carefully advanced, but it would not go past about 2 cm in from the orifice due to stricture of the ureter  A balloon was placed over the wire and the stricture  was dilated    The ureteral scope was then passed again, went through the stricture, seeing the stone which was basketed and extracted without trauma to the ureter  The scope was removed from the ureter, and the cystoscope was used to place a 6F Contour VL stent in the ureter, with the upper coils in the renal pelvis, and the distal coil in the bladder  Retrograde pyelogram on that side confirmed good position and no extravasation of contrast    The patient was awaken from anesthesia and taken to the PACU in good condition        I was present for the entire procedure    Patient Disposition:  PACU     SIGNATURE: Андрей Worrell MD  DATE: September 8, 2018  TIME: 9:36 AM

## 2018-09-08 NOTE — ED RE-EVALUATION NOTE
3:05 AM  Patient was signed out to me earlier  CT shows a 5 millimeter obstructing stone at the distal left ureter resulting in left hydroureternephrosis  Patient still having a lot of pain  Will give another round of pain medications and IV fluids  He is also nauseous again so will give Zofran  Will re-evaluate after this  If patient is still symptomatic will need admission  4:34 AM  Patient still having a lot of pain  Will d/w Urology  4:41 AM  Spoke with Dr Jo Ann Kaiser from Urology  He accepts the patient for admission       Connor Muñoz DO  09/08/18 4335

## 2018-09-08 NOTE — DISCHARGE INSTRUCTIONS
NOTE TO MARK Brito'  WORKPLACE AND EMPLOYER:        HE WAS ADMITTED TO [facility] [address] [facility] [facility] ON 9/7/2018, AND DISCHARGED ON 9/8/2018  HE MAY  NOT WORK UNTIL Thursday, 9/15/2018  START FULL WORK THAT DAY, NO RESTRICTIONS  Joyce Copeland MD      ALL YOUR  PREVIOUS MEDS ARE THE SAME  NEW MEDS:  Cephalexin antibiotic twice a day for five days, Percocet if needed for pain    BE CAREFUL NOT TO PULL THE STRING  EXPECT SOME BLOOD IN URINE, BURNING, FREQUENT URINATION

## 2018-09-08 NOTE — PROGRESS NOTES
Feels well after procedure this morning  Some dysuria as expected  Plan discharged home this afternoon after out of bed, taken liquids, etc   I explained he might have dysuria, urgency, some blood in the urine any time until the stent is out      Cephalexin and hydrocodone if needed for pain

## 2018-09-10 ENCOUNTER — TRANSITIONAL CARE MANAGEMENT (OUTPATIENT)
Dept: FAMILY MEDICINE CLINIC | Facility: CLINIC | Age: 53
End: 2018-09-10

## 2018-09-10 NOTE — CASE MANAGEMENT
Notification of Inpatient Admission/Inpatient Authorization Request  This is a Notification of Inpatient Admission/Request for Inpatient Authorization for our facility [address] [place] [address]  Please be advised that this patient is currently in our facility under Inpatient Status  Below you will find the Attending Physician and Facilitys information including NPI#  and contact information for the Utilization Review Department where the patient is receiving care services  Place of Service Code: 24   [facility]  Presentation Date & Time: 9/8/2018 12:15 AM  Inpatient Admission Date & Time: 9/8/18 0439  Discharge Date & Time: 9/8/2018  3:40 PM   Discharge Disposition (if discharged): Home/Self Care  Attending Physician:   RICHIE Lay  [employer] ID- [id number]  Primary Office:  [address] [place] [address]  Phone 1: [phone]  Phone 2:   Fax: [phone]  Admission Orders:   Admission Orders     Ordered        09/08/18 0439  Inpatient Admission (expected length of stay for this patient is greater than two midnights)  Once               Facility: [address] [place] [address]  Address: [address] [place] [address], [facility]  Phone: [phone] Tax ID: [id number]  NPI: [id number]  Medicare ID: [id number]    Thank you,  [address] [place] [address] Utilization Review Department  Phone: [phone]; Fax [phone]  ATTENTION: Please call with any questions or concerns to [phone]  and carefully follow the prompts so that you are directed to the right person  Send all requests for admission clinical reviews, approved or denied determinations and any other requests to fax [phone]   All voicemails are confidential

## 2018-09-10 NOTE — CASE MANAGEMENT
Initial Clinical Review    Thank you,  [address] [facility] Review Department  Phone: [phone]; Fax [phone]  ATTENTION: Please call with any questions or concerns to [phone]  and carefully follow the prompts so that you are directed to the right person  Send all requests for admission clinical reviews, approved or denied determinations and any other requests to fax [phone]  All voicemails are confidential        Admission: Date/Time/Statement: 9/8/18 @ [phone]     Orders Placed This Encounter   Procedures    Inpatient Admission (expected length of stay for this patient is greater than two midnights)     Standing Status:   Standing     Number of Occurrences:   1     Order Specific Question:   Admitting Physician     Answer:   Andres Randolph [[id number]]     Order Specific Question:   Level of Care     Answer:   Med Surg [16]     Order Specific Question:   Estimated length of stay     Answer:   More than 2 Midnights     Order Specific Question:   Certification     Answer:   I certify that inpatient services are medically necessary for this patient for a duration of greater than two midnights  See H&P and MD Progress Notes for additional information about the patient's course of treatment  ED: Date/Time/Mode of Arrival:   ED Arrival Information     Expected Arrival Acuity Means of Arrival Escorted By Service Admission Type    - 9/8/2018 00:05 Urgent Walk-In Self Urology Urgent    Arrival Complaint    flank pain          Chief Complaint:   Chief Complaint   Patient presents with    Flank Pain     10/10 left flank pain  States wants to make poop and pee, but cannot pass more than a small amount of urine  History of Illness: 63-year-old male with a history of diabetes presents to the emergency department with left flank pain that started at about 8:30 p m     The pain radiates to the left groin area  He had 1 episode of vomiting   He states he is able to urinate but only urinates a small amount  He feels like he has to have a bowel movement but does not          ED Vital Signs:   ED Triage Vitals [09/08/18 0013]   Temperature Pulse Respirations Blood Pressure SpO2   (!) 97 3 °F (36 3 °C) 72 20 148/72 99 %      Temp Source Heart Rate Source Patient Position - Orthostatic VS BP Location FiO2 (%)   Temporal Monitor Sitting Left arm --      Pain Score       Worst Possible Pain        Wt Readings from Last 1 Encounters:   09/08/18 68 kg (150 lb)       Vital Signs (abnormal): WNL    Abnormal Labs/Diagnostic Test Results:    Ref Range & Units 09/08/18 0950   POC Glucose 65 - 140 mg/dl 161        Ref Range & Units 09/08/18 0050   Color, UA  Yellow    Clarity, UA  Clear    pH, UA 4 5 - 8 0 8 5     Leukocytes, UA Negative Negative    Nitrite, UA Negative Negative    Protein, UA Negative mg/dl Negative    Glucose, UA Negative mg/dl 100 (1/10%)     Ketones, UA Negative mg/dl Negative    Urobilinogen, UA 0 2, 1 0 E U /dl E U /dl 0 2    Bilirubin, UA Negative Negative    Blood, UA Negative Moderate     Specific [place], UA 1 003 - 1 030 1 015        CT a/p -- 5 mm obstructing stone the distal left ureter resulting left hydroureteronephrosis  ED Treatment:   Medication Administration from 09/08/2018 0005 to 09/08/2018 0525       Date/Time Order Dose Route Action     09/08/2018 0036 sodium chloride 0 9 % bolus 1,000 mL 1,000 mL Intravenous New Bag     09/08/2018 0036 ketorolac (TORADOL) injection 15 mg 15 mg Intravenous Given     09/08/2018 0035 ondansetron (ZOFRAN) injection 4 mg 4 mg Intravenous Given     09/08/2018 0307 sodium chloride 0 9 % bolus 1,000 mL 1,000 mL Intravenous New Bag     09/08/2018 0309 morphine (PF) 4 mg/mL injection 4 mg 4 mg Intravenous Given     09/08/2018 0307 ondansetron (ZOFRAN) injection 4 mg 4 mg Intravenous Given       Past Medical/Surgical History:    Active Ambulatory Problems     Diagnosis Date Noted    External hemorrhoids 08/27/2013    Hyperlipidemia 04/03/2014    Cystic thyroid nodule 10/06/2016    Abnormal blood sugar 03/22/2018     Past Medical History:   Diagnosis Date    Chronic pain disorder     Closed fracture of fibula     Dental disorder     Diabetes mellitus ([facility] [address] )     Fracture of ankle     Hyperlipidemia     Rectal bleeding        Admitting Diagnosis: Ureterolithiasis [N20 1]  Flank pain [R10 9]    Age/Sex: 48 y o  male    Assessment/Plan:   Proceed with cysto, left ureteroscopy, laser stone, stent, retrograde        OPERATIVE REPORT  SURGERY DATE: 9/8/2018  Procedure(s) (LRB):  CYSTOSCOPY URETEROSCOPY WITH BASKET STONE EXTRACTION,  URETHRAL DILATION, RETROGRADE PYELOGRAM AND INSERTION STENT URETERAL (Left)  Anesthesia Type:   General/LMA   Operative Indications:  Ureterolithiasis [N20 1]   Operative Findings:  Stenotic urethral meatus, 4 mm stone distal left ureter, stricture of distal left ureter   Complications:   None        Admission Orders:  Admit to M/S unit  NSS @ 125 ml/hr  Npo for OR --->  Post-op:  Regular diet  SCD's  Monitor I&O's  IS q 1h while awake  Up as tolerated

## 2018-09-12 ENCOUNTER — CLINICAL SUPPORT (OUTPATIENT)
Dept: UROLOGY | Facility: MEDICAL CENTER | Age: 53
End: 2018-09-12

## 2018-09-12 VITALS — RESPIRATION RATE: 18 BRPM | HEIGHT: 68 IN | WEIGHT: 150 LBS | BODY MASS INDEX: 22.73 KG/M2 | TEMPERATURE: 98.7 F

## 2018-09-12 DIAGNOSIS — N20.1 LEFT URETERAL STONE: Primary | ICD-10-CM

## 2018-09-12 PROCEDURE — 99024 POSTOP FOLLOW-UP VISIT: CPT

## 2018-09-12 NOTE — PROGRESS NOTES
Procedures post op visit  Patient here for stent removal for ureteral stone and dilation  Patient doing well,still has some pain on and off  No fever,urinating fine  Stent was removed without any problems   He  is to call if any problems and make sure he is drinking enough fluids  I will send Dr Aviles Getting a note on this,question is does he want to see him back at all?

## 2018-09-13 DIAGNOSIS — N20.1 URETERAL STONE: Primary | ICD-10-CM

## 2018-09-14 LAB
CA PHOS MFR STONE: 2 %
COLOR STONE: NORMAL
COM MFR STONE: 98 %
COMMENT-STONE3: NORMAL
COMPOSITION: NORMAL
LABORATORY COMMENT REPORT: NORMAL
NIDUS STONE QL: NORMAL
PHOTO: NORMAL
SIZE STONE: NORMAL MM
STONE ANALYSIS-IMP: NORMAL
WT STONE: 25.8 MG

## 2018-09-17 ENCOUNTER — TELEPHONE (OUTPATIENT)
Dept: UROLOGY | Facility: MEDICAL CENTER | Age: 53
End: 2018-09-17

## 2018-09-17 NOTE — TELEPHONE ENCOUNTER
Dr Swapna Poe, I received [facility] paper work for the patients employer  Patient is requesting to be off of work 9/7/2018 thru 10/5/18  I see the patient had his Stent removed in the office 9/12/18  Please advise if the patient qualifies to be out until 10/5/18?

## 2018-09-17 NOTE — TELEPHONE ENCOUNTER
Patient aware by phone his return to work date is 9/20/18  Paper work completed and faxed to [phone]

## 2018-09-17 NOTE — TELEPHONE ENCOUNTER
No, I told him no more than a week off, after stent out    You can say 'there are regulations that control this, etc etc'

## 2018-10-24 DIAGNOSIS — E08.9: ICD-10-CM

## 2018-11-08 ENCOUNTER — OFFICE VISIT (OUTPATIENT)
Dept: FAMILY MEDICINE CLINIC | Facility: CLINIC | Age: 53
End: 2018-11-08
Payer: COMMERCIAL

## 2018-11-08 VITALS
BODY MASS INDEX: 22.59 KG/M2 | HEIGHT: 68 IN | TEMPERATURE: 97.6 F | DIASTOLIC BLOOD PRESSURE: 70 MMHG | RESPIRATION RATE: 18 BRPM | OXYGEN SATURATION: 99 % | HEART RATE: 67 BPM | WEIGHT: 149.06 LBS | SYSTOLIC BLOOD PRESSURE: 110 MMHG

## 2018-11-08 DIAGNOSIS — R73.09 ABNORMAL BLOOD SUGAR: ICD-10-CM

## 2018-11-08 DIAGNOSIS — K64.4 EXTERNAL HEMORRHOIDS: ICD-10-CM

## 2018-11-08 DIAGNOSIS — R73.03 PRE-DIABETES: Primary | ICD-10-CM

## 2018-11-08 DIAGNOSIS — E78.5 HYPERLIPIDEMIA, UNSPECIFIED HYPERLIPIDEMIA TYPE: ICD-10-CM

## 2018-11-08 DIAGNOSIS — M62.838 MUSCLE SPASM: ICD-10-CM

## 2018-11-08 DIAGNOSIS — E04.1 CYSTIC THYROID NODULE: ICD-10-CM

## 2018-11-08 PROBLEM — E11.9 DIABETES MELLITUS (HCC): Status: RESOLVED | Noted: 2018-11-08 | Resolved: 2018-11-08

## 2018-11-08 LAB — SL AMB POCT HEMOGLOBIN AIC: 5.8

## 2018-11-08 PROCEDURE — 83036 HEMOGLOBIN GLYCOSYLATED A1C: CPT | Performed by: PHYSICIAN ASSISTANT

## 2018-11-08 PROCEDURE — 3008F BODY MASS INDEX DOCD: CPT | Performed by: PHYSICIAN ASSISTANT

## 2018-11-08 PROCEDURE — 1036F TOBACCO NON-USER: CPT | Performed by: PHYSICIAN ASSISTANT

## 2018-11-08 PROCEDURE — 99213 OFFICE O/P EST LOW 20 MIN: CPT | Performed by: PHYSICIAN ASSISTANT

## 2018-11-08 RX ORDER — DIAZEPAM 5 MG/1
TABLET ORAL
Qty: 10 TABLET | Refills: 0 | Status: SHIPPED | OUTPATIENT
Start: 2018-11-08 | End: 2020-01-31

## 2018-11-08 NOTE — PROGRESS NOTES
Assessment/Plan:    Cystic thyroid nodule  Will recheck ultrasound now  External hemorrhoids  Recommend lots of water and using fiber daily due to constipation    Abnormal blood sugar  Continue metformin once a day  Problem List Items Addressed This Visit        Digestive    External hemorrhoids     Recommend lots of water and using fiber daily due to constipation            Endocrine    Cystic thyroid nodule     Will recheck ultrasound now  Relevant Orders    US thyroid    TSH baseline       Other    Hyperlipidemia    Relevant Orders    Lipid panel    Abnormal blood sugar     Continue metformin once a day  Other Visit Diagnoses     Pre-diabetes    -  Primary    Relevant Orders    POCT hemoglobin A1c (Completed)    Comprehensive metabolic panel    Lipid panel    Muscle spasm        Relevant Medications    diazepam (VALIUM) 5 mg tablet        discussed that valium is addictive  Take sparingly and do not use when driving  Subjective:      Patient ID: Leoncio Mustafa is a 48 y o  male  HPI 47 y/o M here for follow up of prediabetes  He is eating more right now but is also more physical at work  He has had constipation  Sometimes he has BM once a day but is hard  He has history of muscle spasms in his neck and he took valium rarely and it did help  The following portions of the patient's history were reviewed and updated as appropriate:   He  has a past medical history of Chronic pain disorder; Closed fracture of fibula; Dental disorder; Diabetes mellitus ([facility] [address] ); Fracture of ankle; Hyperlipidemia; and Rectal bleeding    He   Patient Active Problem List    Diagnosis Date Noted    Ureteral stone 09/08/2018    Ureterolithiasis 09/08/2018    Urethral stricture 09/08/2018    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    Abnormal blood sugar 03/22/2018    Cystic thyroid nodule 10/06/2016    Hyperlipidemia 04/03/2014    External hemorrhoids 08/27/2013     He  has a past surgical history that includes Knee surgery; Colonoscopy; Fracture surgery (Left); pr colonoscopy flx dx w/collj spec when pfrmd (N/A, 5/8/2018); and pr cysto/uretero w/lithotripsy &indwell stent insrt (Left, 9/8/2018)  His family history includes Thyroid cancer in his maternal grandmother, maternal uncle, and sister  He  reports that he has never smoked  He has never used smokeless tobacco  He reports that he does not drink alcohol or use drugs  Current Outpatient Prescriptions   Medication Sig Dispense Refill    metFORMIN (GLUCOPHAGE) 500 mg tablet TAKE 1 TABLET BY MOUTH EVERY DAY WITH DINNER 90 tablet 0    diazepam (VALIUM) 5 mg tablet Take 1/2 -1 tablet if needed for muscle spasm at night 10 tablet 0     No current facility-administered medications for this visit  Current Outpatient Prescriptions on File Prior to Visit   Medication Sig    metFORMIN (GLUCOPHAGE) 500 mg tablet TAKE 1 TABLET BY MOUTH EVERY DAY WITH DINNER     No current facility-administered medications on file prior to visit  He has No Known Allergies       Review of Systems   Constitutional: Negative for activity change, appetite change, fatigue, fever and unexpected weight change  HENT: Negative for dental problem, ear pain, hearing loss and sore throat  Eyes: Negative for visual disturbance  Respiratory: Negative for cough and wheezing  Gastrointestinal: Positive for constipation  Negative for abdominal pain, diarrhea and vomiting  Genitourinary: Negative for difficulty urinating and dysuria  Musculoskeletal: Positive for back pain and myalgias  Negative for arthralgias  Skin: Negative for rash  Neurological: Negative for dizziness and headaches  Psychiatric/Behavioral: Negative for behavioral problems           Objective:      /70 (BP Location: Right arm, Patient Position: Sitting, Cuff Size: Standard)   Pulse 67   Temp 97 6 °F (36 4 °C) (Tympanic)   Resp 18   Ht 5' 8" (1 727 m)   Wt 67 6 kg (149 lb 1 oz)   SpO2 99%   BMI 22 66 kg/m²          Physical Exam   Constitutional: He is oriented to person, place, and time  He appears well-developed and well-nourished  No distress  HENT:   Head: Normocephalic and atraumatic  Right Ear: External ear normal    Left Ear: External ear normal    Eyes: Conjunctivae are normal    Neck: Normal range of motion  Neck supple  No thyromegaly present  Cardiovascular: Normal rate, regular rhythm and normal heart sounds  No murmur heard  Pulmonary/Chest: Breath sounds normal  No respiratory distress  He has no wheezes  Musculoskeletal: He exhibits tenderness (left shoulder posteriorly)  Lymphadenopathy:     He has no cervical adenopathy  Neurological: He is alert and oriented to person, place, and time  Psychiatric: He has a normal mood and affect  His behavior is normal    Nursing note and vitals reviewed

## 2018-12-06 ENCOUNTER — TELEPHONE (OUTPATIENT)
Dept: FAMILY MEDICINE CLINIC | Facility: CLINIC | Age: 53
End: 2018-12-06

## 2018-12-06 NOTE — TELEPHONE ENCOUNTER
----- [id number] at 12/4/2018  1:12 PM EST -----  Regarding: statin  Insurance wants him to start on simvastatin because having elevated blood sugar puts him at risk for having heart disease/strokes  I can call it in if he is willling to start  Rare side effects include red urine/flu like muscle pain

## 2018-12-07 ENCOUNTER — TELEPHONE (OUTPATIENT)
Dept: FAMILY MEDICINE CLINIC | Facility: CLINIC | Age: 53
End: 2018-12-07

## 2018-12-07 NOTE — TELEPHONE ENCOUNTER
----- [facility] [id number] [facility] at 12/4/2018  1:12 PM EST -----  Regarding: statin  Insurance wants him to start on simvastatin because having elevated blood sugar puts him at risk for having heart disease/strokes  I can call it in if he is willling to start  Rare side effects include red urine/flu like muscle pain

## 2018-12-13 DIAGNOSIS — R73.09 ABNORMAL BLOOD SUGAR: Primary | ICD-10-CM

## 2018-12-13 RX ORDER — SIMVASTATIN 40 MG
40 TABLET ORAL
Qty: 90 TABLET | Refills: 0 | Status: SHIPPED | OUTPATIENT
Start: 2018-12-13 | End: 2019-03-10 | Stop reason: SDUPTHER

## 2019-03-10 DIAGNOSIS — R73.09 ABNORMAL BLOOD SUGAR: ICD-10-CM

## 2019-03-10 RX ORDER — SIMVASTATIN 40 MG
40 TABLET ORAL
Qty: 90 TABLET | Refills: 0 | Status: SHIPPED | OUTPATIENT
Start: 2019-03-10 | End: 2019-06-03 | Stop reason: SDUPTHER

## 2019-05-09 ENCOUNTER — OFFICE VISIT (OUTPATIENT)
Dept: FAMILY MEDICINE CLINIC | Facility: CLINIC | Age: 54
End: 2019-05-09

## 2019-05-09 ENCOUNTER — TRANSCRIBE ORDERS (OUTPATIENT)
Dept: LAB | Facility: CLINIC | Age: 54
End: 2019-05-09

## 2019-05-09 ENCOUNTER — LAB (OUTPATIENT)
Dept: LAB | Facility: CLINIC | Age: 54
End: 2019-05-09
Payer: COMMERCIAL

## 2019-05-09 VITALS
TEMPERATURE: 96.3 F | BODY MASS INDEX: 23.11 KG/M2 | WEIGHT: 156 LBS | HEIGHT: 69 IN | DIASTOLIC BLOOD PRESSURE: 80 MMHG | OXYGEN SATURATION: 99 % | RESPIRATION RATE: 18 BRPM | HEART RATE: 80 BPM | SYSTOLIC BLOOD PRESSURE: 120 MMHG

## 2019-05-09 DIAGNOSIS — R73.09 ABNORMAL BLOOD SUGAR: ICD-10-CM

## 2019-05-09 DIAGNOSIS — H91.93 BILATERAL HEARING LOSS, UNSPECIFIED HEARING LOSS TYPE: ICD-10-CM

## 2019-05-09 DIAGNOSIS — K64.4 EXTERNAL HEMORRHOIDS: ICD-10-CM

## 2019-05-09 DIAGNOSIS — E11.9 CONTROLLED TYPE 2 DIABETES MELLITUS WITHOUT COMPLICATION, WITHOUT LONG-TERM CURRENT USE OF INSULIN (HCC): Primary | ICD-10-CM

## 2019-05-09 LAB
ALBUMIN SERPL BCP-MCNC: 3.9 G/DL (ref 3.5–5)
ALP SERPL-CCNC: 79 U/L (ref 46–116)
ALT SERPL W P-5'-P-CCNC: 43 U/L (ref 12–78)
ANION GAP SERPL CALCULATED.3IONS-SCNC: 6 MMOL/L (ref 4–13)
AST SERPL W P-5'-P-CCNC: 16 U/L (ref 5–45)
BILIRUB SERPL-MCNC: 0.35 MG/DL (ref 0.2–1)
BUN SERPL-MCNC: 12 MG/DL (ref 5–25)
CALCIUM SERPL-MCNC: 8.3 MG/DL (ref 8.3–10.1)
CHLORIDE SERPL-SCNC: 105 MMOL/L (ref 100–108)
CHOLEST SERPL-MCNC: 159 MG/DL (ref 50–200)
CO2 SERPL-SCNC: 29 MMOL/L (ref 21–32)
CREAT SERPL-MCNC: 0.85 MG/DL (ref 0.6–1.3)
CREAT UR-MCNC: 80.2 MG/DL
GFR SERPL CREATININE-BSD FRML MDRD: 99 ML/MIN/1.73SQ M
GLUCOSE P FAST SERPL-MCNC: 139 MG/DL (ref 65–99)
HDLC SERPL-MCNC: 35 MG/DL (ref 40–60)
LDLC SERPL CALC-MCNC: 93 MG/DL (ref 0–100)
MICROALBUMIN UR-MCNC: 5.2 MG/L (ref 0–20)
MICROALBUMIN/CREAT 24H UR: 6 MG/G CREATININE (ref 0–30)
NONHDLC SERPL-MCNC: 124 MG/DL
POTASSIUM SERPL-SCNC: 3.9 MMOL/L (ref 3.5–5.3)
PROT SERPL-MCNC: 8.2 G/DL (ref 6.4–8.2)
SL AMB POCT HEMOGLOBIN AIC: 7 (ref ?–6.5)
SODIUM SERPL-SCNC: 140 MMOL/L (ref 136–145)
TRIGL SERPL-MCNC: 155 MG/DL
TSH SERPL DL<=0.05 MIU/L-ACNC: 1.36 UIU/ML (ref 0.36–3.74)

## 2019-05-09 PROCEDURE — 82570 ASSAY OF URINE CREATININE: CPT | Performed by: PHYSICIAN ASSISTANT

## 2019-05-09 PROCEDURE — 3051F HG A1C>EQUAL 7.0%<8.0%: CPT | Performed by: PHYSICIAN ASSISTANT

## 2019-05-09 PROCEDURE — 83036 HEMOGLOBIN GLYCOSYLATED A1C: CPT | Performed by: PHYSICIAN ASSISTANT

## 2019-05-09 PROCEDURE — 80061 LIPID PANEL: CPT

## 2019-05-09 PROCEDURE — 1036F TOBACCO NON-USER: CPT | Performed by: PHYSICIAN ASSISTANT

## 2019-05-09 PROCEDURE — 36415 COLL VENOUS BLD VENIPUNCTURE: CPT

## 2019-05-09 PROCEDURE — 84443 ASSAY THYROID STIM HORMONE: CPT

## 2019-05-09 PROCEDURE — 3061F NEG MICROALBUMINURIA REV: CPT | Performed by: PHYSICIAN ASSISTANT

## 2019-05-09 PROCEDURE — 80053 COMPREHEN METABOLIC PANEL: CPT

## 2019-05-09 PROCEDURE — 99214 OFFICE O/P EST MOD 30 MIN: CPT | Performed by: PHYSICIAN ASSISTANT

## 2019-05-09 PROCEDURE — 82043 UR ALBUMIN QUANTITATIVE: CPT | Performed by: PHYSICIAN ASSISTANT

## 2019-05-09 PROCEDURE — 3008F BODY MASS INDEX DOCD: CPT | Performed by: PHYSICIAN ASSISTANT

## 2019-06-03 DIAGNOSIS — R73.09 ABNORMAL BLOOD SUGAR: ICD-10-CM

## 2019-06-03 RX ORDER — SIMVASTATIN 40 MG
40 TABLET ORAL
Qty: 90 TABLET | Refills: 0 | Status: SHIPPED | OUTPATIENT
Start: 2019-06-03 | End: 2019-12-16 | Stop reason: SDUPTHER

## 2019-07-18 ENCOUNTER — TELEPHONE (OUTPATIENT)
Dept: FAMILY MEDICINE CLINIC | Facility: CLINIC | Age: 54
End: 2019-07-18

## 2019-07-18 NOTE — TELEPHONE ENCOUNTER
Spoke w/ pt and informed of appt  He said he was worried about getting a bill I assured him Dr Mohini Wall office takes his insurance, but I recommenced he call his insurance before the appt to make sure he doesn't get a large bill  I told him I made the appt for 2 and a half weeks from now so he will have plenty of time to check with insurance  I mailed him all appt information and he understood

## 2019-07-24 ENCOUNTER — TELEPHONE (OUTPATIENT)
Dept: FAMILY MEDICINE CLINIC | Facility: CLINIC | Age: 54
End: 2019-07-24

## 2019-07-24 NOTE — TELEPHONE ENCOUNTER
Pt s/x 8/5 @ [address] [facility] [address], [facility]   Phone: [phone]

## 2019-12-13 DIAGNOSIS — R73.09 ABNORMAL BLOOD SUGAR: ICD-10-CM

## 2019-12-13 DIAGNOSIS — E11.9 CONTROLLED TYPE 2 DIABETES MELLITUS WITHOUT COMPLICATION, WITHOUT LONG-TERM CURRENT USE OF INSULIN (HCC): ICD-10-CM

## 2019-12-16 RX ORDER — SIMVASTATIN 40 MG
40 TABLET ORAL
Qty: 90 TABLET | Refills: 0 | Status: SHIPPED | OUTPATIENT
Start: 2019-12-16 | End: 2020-03-23 | Stop reason: SDUPTHER

## 2020-01-31 ENCOUNTER — OFFICE VISIT (OUTPATIENT)
Dept: FAMILY MEDICINE CLINIC | Facility: CLINIC | Age: 55
End: 2020-01-31

## 2020-01-31 VITALS
DIASTOLIC BLOOD PRESSURE: 80 MMHG | HEIGHT: 69 IN | WEIGHT: 146.4 LBS | RESPIRATION RATE: 16 BRPM | HEART RATE: 84 BPM | TEMPERATURE: 96 F | BODY MASS INDEX: 21.68 KG/M2 | OXYGEN SATURATION: 99 % | SYSTOLIC BLOOD PRESSURE: 132 MMHG

## 2020-01-31 DIAGNOSIS — Z11.59 ENCOUNTER FOR HEPATITIS C SCREENING TEST FOR LOW RISK PATIENT: ICD-10-CM

## 2020-01-31 DIAGNOSIS — G89.29 CHRONIC BILATERAL THORACIC BACK PAIN: ICD-10-CM

## 2020-01-31 DIAGNOSIS — F41.9 ANXIETY: ICD-10-CM

## 2020-01-31 DIAGNOSIS — E11.9 TYPE 2 DIABETES MELLITUS WITHOUT COMPLICATION, WITHOUT LONG-TERM CURRENT USE OF INSULIN (HCC): Primary | ICD-10-CM

## 2020-01-31 DIAGNOSIS — E04.1 CYSTIC THYROID NODULE: ICD-10-CM

## 2020-01-31 DIAGNOSIS — M54.6 CHRONIC BILATERAL THORACIC BACK PAIN: ICD-10-CM

## 2020-01-31 DIAGNOSIS — Z12.5 SCREENING FOR MALIGNANT NEOPLASM OF PROSTATE: ICD-10-CM

## 2020-01-31 DIAGNOSIS — E78.5 HYPERLIPIDEMIA, UNSPECIFIED HYPERLIPIDEMIA TYPE: ICD-10-CM

## 2020-01-31 LAB — SL AMB POCT HEMOGLOBIN AIC: 6.1 (ref ?–6.5)

## 2020-01-31 PROCEDURE — 83036 HEMOGLOBIN GLYCOSYLATED A1C: CPT | Performed by: PHYSICIAN ASSISTANT

## 2020-01-31 PROCEDURE — 3044F HG A1C LEVEL LT 7.0%: CPT | Performed by: PHYSICIAN ASSISTANT

## 2020-01-31 PROCEDURE — 99213 OFFICE O/P EST LOW 20 MIN: CPT | Performed by: PHYSICIAN ASSISTANT

## 2020-01-31 PROCEDURE — 3008F BODY MASS INDEX DOCD: CPT | Performed by: PHYSICIAN ASSISTANT

## 2020-01-31 PROCEDURE — 1036F TOBACCO NON-USER: CPT | Performed by: PHYSICIAN ASSISTANT

## 2020-01-31 RX ORDER — DIAZEPAM 10 MG/1
10 TABLET ORAL ONCE AS NEEDED
Qty: 30 TABLET | Refills: 0 | Status: SHIPPED | OUTPATIENT
Start: 2020-01-31 | End: 2021-07-30

## 2020-01-31 NOTE — PROGRESS NOTES
Assessment/Plan:    Type 2 diabetes mellitus without complication, without long-term current use of insulin ([facility])    Lab Results   Component Value Date    HGBA1C 6 1 01/31/2020   continue with metformin  Recommend diabetic eye exam to be scheduled    Chronic upper back pain  Continue PRN valium  Discussed that medication is both addictive and can cause overdose even at prescribed does  He is aware of this and has been using the medicine PRN for more than 10 years without problem  He is to not take medication when he will be working the next day  Problem List Items Addressed This Visit        Endocrine    Cystic thyroid nodule    Relevant Orders    TSH, 3rd generation    US thyroid    Type 2 diabetes mellitus without complication, without long-term current use of insulin ([facility] [address] ) - Primary       Lab Results   Component Value Date    HGBA1C 6 1 01/31/2020   continue with metformin  Recommend diabetic eye exam to be scheduled         Relevant Orders    POCT hemoglobin A1c (Completed)       Other    Hyperlipidemia    Relevant Orders    Comprehensive metabolic panel    CBC and differential    Lipid panel      Other Visit Diagnoses     Screening for malignant neoplasm of prostate        Relevant Orders    PSA, Total Screen    Encounter for hepatitis C screening test for low risk patient        Relevant Orders    Hepatitis C antibody    Chronic bilateral thoracic back pain        Relevant Medications    diazepam (VALIUM) 10 mg tablet    Anxiety        Relevant Medications    diazepam (VALIUM) 10 mg tablet            Subjective:      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  HPI 47year old M here for follow up of diabetes  He had vision check at [facility] 5 months in [place]  No complaints of foot wounds or numbness  He does check his feet regularly and his blood sugar  He had accident in  [employer] in 2010 and was being prescribed valium  The clinic he was going to closed    He will take it on his day off or when not in work  It is for muscle spasm in his back  He was taking 10mg of valium  He has had MRI and therapy done for his back  This was all related to a workmans comp issue that he had 10 years ago  He also feels like it reduces his anxiety and stress  PHQ-9 Depression Screening    PHQ-9:    Frequency of the following problems over the past two weeks:       Little interest or pleasure in doing things:  1 - several days  Feeling down, depressed, or hopeless:  1 - several days  PHQ-2 Score:  2         The following portions of the patient's history were reviewed and updated as appropriate:   He  has a past medical history of Chronic pain disorder, Closed fracture of fibula, Dental disorder, Fracture of ankle, and Hyperlipidemia  He   Patient Active Problem List    Diagnosis Date Noted    Chronic upper back pain 02/02/2020    Type 2 diabetes mellitus without complication, without long-term current use of insulin ([facility] [address] ) 11/08/2018    Ureteral stone 09/08/2018    Ureterolithiasis 09/08/2018    Urethral stricture 09/08/2018    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    Cystic thyroid nodule 10/06/2016    Hyperlipidemia 04/03/2014    External hemorrhoids 08/27/2013     He  has a past surgical history that includes Knee surgery; Colonoscopy; Fracture surgery (Left); pr colonoscopy flx dx w/collj spec when pfrmd (N/A, 5/8/2018); and pr cysto/uretero w/lithotripsy &indwell stent insrt (Left, 9/8/2018)  His family history includes Thyroid cancer in his maternal grandmother, maternal uncle, and sister  He  reports that he has never smoked  He has never used smokeless tobacco  He reports that he does not drink alcohol or use drugs    Current Outpatient Medications   Medication Sig Dispense Refill    hydrocortisone (ANUSOL-HC, PROCTOSOL HC,) 2 5 % rectal cream Insert into the rectum 2 (two) times a day For up to 5 days if needed 30 g 0    metFORMIN (GLUCOPHAGE) 500 mg tablet TAKE 1 TABLET BY MOUTH IN THE MORNING AND 1 IN THE EVENING WITH  DINNER 180 tablet 0    simvastatin (ZOCOR) 40 mg tablet Take 1 tablet (40 mg total) by mouth daily at bedtime 90 tablet 0    diazepam (VALIUM) 10 mg tablet Take 1 tablet (10 mg total) by mouth once as needed for anxiety for up to 30 doses 30 tablet 0     No current facility-administered medications for this visit  Current Outpatient Medications on File Prior to Visit   Medication Sig    hydrocortisone (ANUSOL-HC, PROCTOSOL HC,) 2 5 % rectal cream Insert into the rectum 2 (two) times a day For up to 5 days if needed    metFORMIN (GLUCOPHAGE) 500 mg tablet TAKE 1 TABLET BY MOUTH IN THE MORNING AND 1 IN THE EVENING WITH  DINNER    simvastatin (ZOCOR) 40 mg tablet Take 1 tablet (40 mg total) by mouth daily at bedtime     No current facility-administered medications on file prior to visit  He has No Known Allergies       Review of Systems   Constitutional: Negative for activity change, appetite change, fatigue, fever and unexpected weight change  HENT: Negative for dental problem, ear pain, hearing loss and sore throat  Eyes: Negative for visual disturbance  Respiratory: Negative for cough and wheezing  Gastrointestinal: Negative for abdominal pain, constipation, diarrhea and vomiting  Genitourinary: Negative for difficulty urinating and dysuria  Musculoskeletal: Positive for back pain  Negative for arthralgias and myalgias  Skin: Negative for rash  Neurological: Negative for dizziness and headaches  Psychiatric/Behavioral: Negative for behavioral problems  The patient is nervous/anxious (sometimes)  Objective:      /80 (BP Location: Left arm, Patient Position: Standing, Cuff Size: Standard)   Pulse 84   Temp (!) 96 °F (35 6 °C)   Resp 16   Ht 5' 9" (1 753 m)   Wt 66 4 kg (146 lb 6 4 oz)   SpO2 99%   BMI 21 62 kg/m²          Physical Exam   Constitutional: He is oriented to person, place, and time   He appears well-developed and well-nourished  No distress  HENT:   Head: Normocephalic and atraumatic  Right Ear: External ear normal    Left Ear: External ear normal    Eyes: Conjunctivae are normal    Neck: Normal range of motion  Neck supple  No thyromegaly present  Cardiovascular: Normal rate, regular rhythm and normal heart sounds  No murmur heard  Pulses:       Dorsalis pedis pulses are 2+ on the right side, and 2+ on the left side  Posterior tibial pulses are 2+ on the right side, and 2+ on the left side  Pulmonary/Chest: Effort normal and breath sounds normal  No respiratory distress  He has no wheezes  Musculoskeletal: Normal range of motion  He exhibits tenderness (bilateral trapezius)  Feet:   Right Foot:   Skin Integrity: Negative for ulcer, skin breakdown, erythema, warmth, callus or dry skin  Left Foot:   Skin Integrity: Negative for ulcer, skin breakdown, erythema, warmth, callus or dry skin  Lymphadenopathy:     He has no cervical adenopathy  Neurological: He is alert and oriented to person, place, and time  Psychiatric: He has a normal mood and affect  His behavior is normal    Nursing note and vitals reviewed  Patient's shoes and socks removed  Right Foot/Ankle   Right Foot Inspection  Skin Exam: skin normal and skin intact no dry skin, no warmth, no callus, no erythema, no maceration, no abnormal color, no pre-ulcer, no ulcer and no callus                          Toe Exam: ROM and strength within normal limits  Sensory   Vibration: intact    Monofilament testing: intact  Vascular    The right DP pulse is 2+  The right PT pulse is 2+  Left Foot/Ankle  Left Foot Inspection  Skin Exam: skin normal and skin intactno dry skin, no warmth, no erythema, no maceration, normal color, no pre-ulcer, no ulcer and no callus                         Toe Exam: ROM and strength within normal limits                   Sensory   Vibration: intact    Monofilament: intact  Vascular    The left DP pulse is 2+  The left PT pulse is 2+  Assign Risk Category:  No deformity present;  No loss of protective sensation;        Risk: 0

## 2020-02-02 PROBLEM — G89.29 CHRONIC UPPER BACK PAIN: Status: ACTIVE | Noted: 2020-02-02

## 2020-02-02 PROBLEM — M54.9 CHRONIC UPPER BACK PAIN: Status: ACTIVE | Noted: 2020-02-02

## 2020-02-02 NOTE — ASSESSMENT & PLAN NOTE
Lab Results   Component Value Date    HGBA1C 6 1 01/31/2020   continue with metformin    Recommend diabetic eye exam to be scheduled

## 2020-02-02 NOTE — ASSESSMENT & PLAN NOTE
Continue PRN valium  Discussed that medication is both addictive and can cause overdose even at prescribed does  He is aware of this and has been using the medicine PRN for more than 10 years without problem  He is to not take medication when he will be working the next day

## 2020-02-03 ENCOUNTER — TELEPHONE (OUTPATIENT)
Dept: FAMILY MEDICINE CLINIC | Facility: CLINIC | Age: 55
End: 2020-02-03

## 2020-02-03 ENCOUNTER — VBI (OUTPATIENT)
Dept: FAMILY MEDICINE CLINIC | Facility: CLINIC | Age: 55
End: 2020-02-03

## 2020-02-03 NOTE — LETTER
Diabetic Eye Exam Form    Date Requested: 20  Patient: Denita Choudhary  Patient : 1965   Referring Provider: Nataly Palomo PA-C    Dilated Retinal Exam, Optomap-Iris Exam, or Fundus Photography Done         Yes ([place] one above)         No     Date of Diabetic Eye Exam ______________________________  Left Eye      Exam did show retinopathy    Exam did not show retinopathy         Mild       Moderate       None       Proliferative       Severe     Right Eye     Exam did show retinopathy    Exam did not show retinopathy         Mild       Moderate       None       Proliferative       Severe     Comments __________________________________________________________    Practice Providing Exam ______________________________________________    Exam Performed By (print name) _______________________________________      Provider Signature ___________________________________________________      These reports are needed for  compliance    Please fax this completed form and a copy of the Diabetic Eye Exam report to our office as soon as possible to [phone] [facility]: Phone [phone]    We thank you for your assistance in treating our mutual patient

## 2020-02-03 NOTE — TELEPHONE ENCOUNTER
[place] Int# [id number] LM on       US thyroid apt([phone]) is on Friday,  2/14/20 at 1 pm at [address], [place]

## 2020-02-03 NOTE — TELEPHONE ENCOUNTER
02/03/20 8:50 AM     Upon review of the In Basket request and the patient's chart, initial outreach has been made to the [address] via fax to  [phone]  A second outreach attempt will be made within 3 business days  Thank you  Alanis Baca     02/06/20 4:25 PM     As a follow-up, a second attempt has been made for outreach to the [address] via fax to  [phone]  A third and final attempt will be made within 3 business days  Thank you  Alanis Baca     02/27/20 9:40 AM         As a final attempt, telephonic outreach was made to [facility] in [place] at   I spoke with Shannan Chowdhury who stated that Tanisha Ramos has never been seen there  This In Basket will be closed and completed at this time  Should we receive the requested information because of previous outreach attempts, the requested patient's chart will be updated appropriately      *To review the status updates documented on this request, open sent messages within your In Basket and select this message to view comments   If comments do not automatically appear for review, select the properties button to review*        Thank you    Alanis Baca

## 2020-02-21 ENCOUNTER — HOSPITAL ENCOUNTER (OUTPATIENT)
Dept: ULTRASOUND IMAGING | Facility: HOSPITAL | Age: 55
Discharge: HOME | End: 2020-02-21
Payer: COMMERCIAL

## 2020-02-21 DIAGNOSIS — E04.1 CYSTIC THYROID NODULE: ICD-10-CM

## 2020-02-21 PROCEDURE — 76536 US EXAM OF HEAD AND NECK: CPT

## 2020-02-27 NOTE — RESULT ENCOUNTER NOTE
Thyroid nodules are small and do not need any biopsy    We should continue to check his thyroid ultrasound in a year low to make sure they remain stable

## 2020-02-28 ENCOUNTER — APPOINTMENT (OUTPATIENT)
Dept: LAB | Age: 55
End: 2020-02-28
Payer: COMMERCIAL

## 2020-02-28 DIAGNOSIS — Z12.5 SCREENING FOR MALIGNANT NEOPLASM OF PROSTATE: ICD-10-CM

## 2020-02-28 DIAGNOSIS — E78.5 HYPERLIPIDEMIA, UNSPECIFIED HYPERLIPIDEMIA TYPE: ICD-10-CM

## 2020-02-28 DIAGNOSIS — Z11.59 ENCOUNTER FOR HEPATITIS C SCREENING TEST FOR LOW RISK PATIENT: ICD-10-CM

## 2020-02-28 DIAGNOSIS — E04.1 CYSTIC THYROID NODULE: ICD-10-CM

## 2020-02-28 LAB
ALBUMIN SERPL BCP-MCNC: 3.7 G/DL (ref 3.5–5)
ALP SERPL-CCNC: 59 U/L (ref 46–116)
ALT SERPL W P-5'-P-CCNC: 26 U/L (ref 12–78)
ANION GAP SERPL CALCULATED.3IONS-SCNC: 6 MMOL/L (ref 4–13)
AST SERPL W P-5'-P-CCNC: 18 U/L (ref 5–45)
BASOPHILS # BLD AUTO: 0.01 THOUSANDS/ΜL (ref 0–0.1)
BASOPHILS NFR BLD AUTO: 0 % (ref 0–1)
BILIRUB SERPL-MCNC: 0.5 MG/DL (ref 0.2–1)
BUN SERPL-MCNC: 18 MG/DL (ref 5–25)
CALCIUM SERPL-MCNC: 8.8 MG/DL (ref 8.3–10.1)
CHLORIDE SERPL-SCNC: 110 MMOL/L (ref 100–108)
CHOLEST SERPL-MCNC: 143 MG/DL (ref 50–200)
CO2 SERPL-SCNC: 27 MMOL/L (ref 21–32)
CREAT SERPL-MCNC: 0.85 MG/DL (ref 0.6–1.3)
EOSINOPHIL # BLD AUTO: 0.13 THOUSAND/ΜL (ref 0–0.61)
EOSINOPHIL NFR BLD AUTO: 3 % (ref 0–6)
ERYTHROCYTE [DISTWIDTH] IN BLOOD BY AUTOMATED COUNT: 13.2 % (ref 11.6–15.1)
GFR SERPL CREATININE-BSD FRML MDRD: 99 ML/MIN/1.73SQ M
GLUCOSE P FAST SERPL-MCNC: 110 MG/DL (ref 65–99)
HCT VFR BLD AUTO: 42.2 % (ref 36.5–49.3)
HCV AB SER QL: NORMAL
HDLC SERPL-MCNC: 39 MG/DL
HGB BLD-MCNC: 14.1 G/DL (ref 12–17)
IMM GRANULOCYTES # BLD AUTO: 0.01 THOUSAND/UL (ref 0–0.2)
IMM GRANULOCYTES NFR BLD AUTO: 0 % (ref 0–2)
LDLC SERPL CALC-MCNC: 84 MG/DL (ref 0–100)
LYMPHOCYTES # BLD AUTO: 1.29 THOUSANDS/ΜL (ref 0.6–4.47)
LYMPHOCYTES NFR BLD AUTO: 30 % (ref 14–44)
MCH RBC QN AUTO: 29.6 PG (ref 26.8–34.3)
MCHC RBC AUTO-ENTMCNC: 33.4 G/DL (ref 31.4–37.4)
MCV RBC AUTO: 89 FL (ref 82–98)
MONOCYTES # BLD AUTO: 0.4 THOUSAND/ΜL (ref 0.17–1.22)
MONOCYTES NFR BLD AUTO: 9 % (ref 4–12)
NEUTROPHILS # BLD AUTO: 2.42 THOUSANDS/ΜL (ref 1.85–7.62)
NEUTS SEG NFR BLD AUTO: 58 % (ref 43–75)
NONHDLC SERPL-MCNC: 104 MG/DL
NRBC BLD AUTO-RTO: 0 /100 WBCS
PLATELET # BLD AUTO: 240 THOUSANDS/UL (ref 149–390)
PMV BLD AUTO: 10.8 FL (ref 8.9–12.7)
POTASSIUM SERPL-SCNC: 4.5 MMOL/L (ref 3.5–5.3)
PROT SERPL-MCNC: 7.2 G/DL (ref 6.4–8.2)
PSA SERPL-MCNC: 1.2 NG/ML (ref 0–4)
RBC # BLD AUTO: 4.76 MILLION/UL (ref 3.88–5.62)
SODIUM SERPL-SCNC: 143 MMOL/L (ref 136–145)
TRIGL SERPL-MCNC: 98 MG/DL
TSH SERPL DL<=0.05 MIU/L-ACNC: 1.57 UIU/ML (ref 0.36–3.74)
WBC # BLD AUTO: 4.26 THOUSAND/UL (ref 4.31–10.16)

## 2020-02-28 PROCEDURE — 85025 COMPLETE CBC W/AUTO DIFF WBC: CPT

## 2020-02-28 PROCEDURE — 86803 HEPATITIS C AB TEST: CPT

## 2020-02-28 PROCEDURE — 80053 COMPREHEN METABOLIC PANEL: CPT

## 2020-02-28 PROCEDURE — 36415 COLL VENOUS BLD VENIPUNCTURE: CPT

## 2020-02-28 PROCEDURE — 84443 ASSAY THYROID STIM HORMONE: CPT

## 2020-02-28 PROCEDURE — G0103 PSA SCREENING: HCPCS

## 2020-02-28 PROCEDURE — 80061 LIPID PANEL: CPT

## 2020-03-03 ENCOUNTER — TELEPHONE (OUTPATIENT)
Dept: FAMILY MEDICINE CLINIC | Facility: CLINIC | Age: 55
End: 2020-03-03

## 2020-03-19 DIAGNOSIS — E11.9 CONTROLLED TYPE 2 DIABETES MELLITUS WITHOUT COMPLICATION, WITHOUT LONG-TERM CURRENT USE OF INSULIN (HCC): ICD-10-CM

## 2020-03-20 ENCOUNTER — TELEPHONE (OUTPATIENT)
Dept: FAMILY MEDICINE CLINIC | Facility: CLINIC | Age: 55
End: 2020-03-20

## 2020-03-23 DIAGNOSIS — R73.09 ABNORMAL BLOOD SUGAR: ICD-10-CM

## 2020-03-23 RX ORDER — SIMVASTATIN 40 MG
40 TABLET ORAL
Qty: 90 TABLET | Refills: 0 | Status: SHIPPED | OUTPATIENT
Start: 2020-03-23 | End: 2020-07-14 | Stop reason: SDUPTHER

## 2020-07-14 DIAGNOSIS — R73.09 ABNORMAL BLOOD SUGAR: ICD-10-CM

## 2020-07-14 DIAGNOSIS — M54.6 CHRONIC BILATERAL THORACIC BACK PAIN: ICD-10-CM

## 2020-07-14 DIAGNOSIS — G89.29 CHRONIC BILATERAL THORACIC BACK PAIN: ICD-10-CM

## 2020-07-14 DIAGNOSIS — F41.9 ANXIETY: ICD-10-CM

## 2020-07-14 DIAGNOSIS — E11.9 CONTROLLED TYPE 2 DIABETES MELLITUS WITHOUT COMPLICATION, WITHOUT LONG-TERM CURRENT USE OF INSULIN (HCC): ICD-10-CM

## 2020-07-16 RX ORDER — SIMVASTATIN 40 MG
40 TABLET ORAL
Qty: 90 TABLET | Refills: 0 | Status: SHIPPED | OUTPATIENT
Start: 2020-07-16 | End: 2020-12-14

## 2020-12-13 DIAGNOSIS — R73.09 ABNORMAL BLOOD SUGAR: ICD-10-CM

## 2020-12-13 DIAGNOSIS — E11.9 CONTROLLED TYPE 2 DIABETES MELLITUS WITHOUT COMPLICATION, WITHOUT LONG-TERM CURRENT USE OF INSULIN (HCC): ICD-10-CM

## 2020-12-14 DIAGNOSIS — E11.9 CONTROLLED TYPE 2 DIABETES MELLITUS WITHOUT COMPLICATION, WITHOUT LONG-TERM CURRENT USE OF INSULIN (HCC): ICD-10-CM

## 2020-12-14 DIAGNOSIS — R73.09 ABNORMAL BLOOD SUGAR: ICD-10-CM

## 2020-12-14 RX ORDER — SIMVASTATIN 40 MG
TABLET ORAL
Qty: 90 TABLET | Refills: 0 | Status: SHIPPED | OUTPATIENT
Start: 2020-12-14 | End: 2020-12-14 | Stop reason: SDUPTHER

## 2020-12-14 RX ORDER — SIMVASTATIN 40 MG
40 TABLET ORAL
Qty: 90 TABLET | Refills: 0 | Status: SHIPPED | OUTPATIENT
Start: 2020-12-14 | End: 2021-02-19 | Stop reason: SDUPTHER

## 2021-01-05 ENCOUNTER — TELEPHONE (OUTPATIENT)
Dept: FAMILY MEDICINE CLINIC | Facility: CLINIC | Age: 56
End: 2021-01-05

## 2021-01-12 ENCOUNTER — TELEPHONE (OUTPATIENT)
Dept: FAMILY MEDICINE CLINIC | Facility: CLINIC | Age: 56
End: 2021-01-12

## 2021-01-14 ENCOUNTER — OFFICE VISIT (OUTPATIENT)
Dept: FAMILY MEDICINE CLINIC | Facility: CLINIC | Age: 56
End: 2021-01-14

## 2021-01-14 DIAGNOSIS — Z20.822 EXPOSURE TO COVID-19 VIRUS: Primary | ICD-10-CM

## 2021-01-14 PROCEDURE — 99213 OFFICE O/P EST LOW 20 MIN: CPT | Performed by: NURSE PRACTITIONER
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
## 2021-01-15 DIAGNOSIS — Z20.822 EXPOSURE TO COVID-19 VIRUS: ICD-10-CM

## 2021-01-15 PROCEDURE — U0005 INFEC AGEN DETEC AMPLI PROBE: HCPCS | Performed by: NURSE PRACTITIONER

## 2021-01-15 PROCEDURE — U0003 INFECTIOUS AGENT DETECTION BY NUCLEIC ACID (DNA OR RNA); SEVERE ACUTE RESPIRATORY SYNDROME CORONAVIRUS 2 (SARS-COV-2) (CORONAVIRUS DISEASE [COVID-19]), AMPLIFIED PROBE TECHNIQUE, MAKING USE OF HIGH THROUGHPUT TECHNOLOGIES AS DESCRIBED BY CMS-2020-01-R: HCPCS | Performed by: NURSE PRACTITIONER

## 2021-01-16 LAB — SARS-COV-2 RNA SPEC QL NAA+PROBE: NOT DETECTED

## 2021-01-18 ENCOUNTER — TELEMEDICINE (OUTPATIENT)
Dept: FAMILY MEDICINE CLINIC | Facility: CLINIC | Age: 56
End: 2021-01-18

## 2021-01-18 DIAGNOSIS — Z20.822 EXPOSURE TO COVID-19 VIRUS: ICD-10-CM

## 2021-01-18 DIAGNOSIS — B34.9 VIRAL INFECTION, UNSPECIFIED: ICD-10-CM

## 2021-01-18 PROCEDURE — 99213 OFFICE O/P EST LOW 20 MIN: CPT | Performed by: PHYSICIAN ASSISTANT

## 2021-01-18 NOTE — LETTER
January 18, 2021     Patient: Renaldo Toro   YOB: 1965   Date of Visit: 1/18/2021       To Whom it May Concern:    Rosalinda Horta is under my professional care  He was seen in my office on 1/18/2021  He may return to work on 1/26/2021 if test is negative  If you have any questions or concerns, please don't hesitate to call           Sincerely,          Kam Olmedo PA-C        CC: No Recipients
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
## 2021-01-20 ENCOUNTER — TELEPHONE (OUTPATIENT)
Dept: FAMILY MEDICINE CLINIC | Facility: CLINIC | Age: 56
End: 2021-01-20

## 2021-01-20 NOTE — TELEPHONE ENCOUNTER
----- Message from [id number] [place], [place] [address]AUBRIE sent at 1/18/2021  3:36 PM EST -----  Regarding: physical/dm  Patient should setup physical /dm check for next month   He likely has covid now

## 2021-01-21 DIAGNOSIS — Z20.822 EXPOSURE TO COVID-19 VIRUS: ICD-10-CM

## 2021-01-21 DIAGNOSIS — B34.9 VIRAL INFECTION, UNSPECIFIED: ICD-10-CM

## 2021-01-21 PROCEDURE — U0005 INFEC AGEN DETEC AMPLI PROBE: HCPCS | Performed by: PHYSICIAN ASSISTANT

## 2021-01-21 PROCEDURE — U0003 INFECTIOUS AGENT DETECTION BY NUCLEIC ACID (DNA OR RNA); SEVERE ACUTE RESPIRATORY SYNDROME CORONAVIRUS 2 (SARS-COV-2) (CORONAVIRUS DISEASE [COVID-19]), AMPLIFIED PROBE TECHNIQUE, MAKING USE OF HIGH THROUGHPUT TECHNOLOGIES AS DESCRIBED BY CMS-2020-01-R: HCPCS | Performed by: PHYSICIAN ASSISTANT

## 2021-01-22 LAB — SARS-COV-2 RNA RESP QL NAA+PROBE: NEGATIVE

## 2021-01-22 NOTE — RESULT ENCOUNTER NOTE
If he has been avoiding his wife who has Gaviota Saldana since we last talked and he has no symptoms he can go back to work on Tuesday  I will put in a note for him  Also he needs to schedule dm f/u    Let me know if he has been isolating

## 2021-02-19 ENCOUNTER — OFFICE VISIT (OUTPATIENT)
Dept: FAMILY MEDICINE CLINIC | Facility: CLINIC | Age: 56
End: 2021-02-19

## 2021-02-19 VITALS
DIASTOLIC BLOOD PRESSURE: 78 MMHG | HEART RATE: 81 BPM | WEIGHT: 152 LBS | TEMPERATURE: 97.2 F | OXYGEN SATURATION: 97 % | HEIGHT: 69 IN | SYSTOLIC BLOOD PRESSURE: 122 MMHG | BODY MASS INDEX: 22.51 KG/M2 | RESPIRATION RATE: 16 BRPM

## 2021-02-19 DIAGNOSIS — M54.50 CHRONIC BILATERAL LOW BACK PAIN WITHOUT SCIATICA: ICD-10-CM

## 2021-02-19 DIAGNOSIS — E04.1 CYSTIC THYROID NODULE: ICD-10-CM

## 2021-02-19 DIAGNOSIS — R73.09 ABNORMAL BLOOD SUGAR: ICD-10-CM

## 2021-02-19 DIAGNOSIS — E11.9 CONTROLLED TYPE 2 DIABETES MELLITUS WITHOUT COMPLICATION, WITHOUT LONG-TERM CURRENT USE OF INSULIN (HCC): Primary | ICD-10-CM

## 2021-02-19 DIAGNOSIS — R39.11 URINARY HESITANCY: ICD-10-CM

## 2021-02-19 DIAGNOSIS — G89.29 CHRONIC BILATERAL LOW BACK PAIN WITHOUT SCIATICA: ICD-10-CM

## 2021-02-19 LAB
CREAT UR-MCNC: 107 MG/DL
MICROALBUMIN UR-MCNC: 9.4 MG/L (ref 0–20)
MICROALBUMIN/CREAT 24H UR: 9 MG/G CREATININE (ref 0–30)
SL AMB POCT HEMOGLOBIN AIC: 6.9 (ref ?–6.5)

## 2021-02-19 PROCEDURE — 82570 ASSAY OF URINE CREATININE: CPT | Performed by: PHYSICIAN ASSISTANT

## 2021-02-19 PROCEDURE — 83036 HEMOGLOBIN GLYCOSYLATED A1C: CPT | Performed by: PHYSICIAN ASSISTANT

## 2021-02-19 PROCEDURE — 3044F HG A1C LEVEL LT 7.0%: CPT | Performed by: PHYSICIAN ASSISTANT

## 2021-02-19 PROCEDURE — 3061F NEG MICROALBUMINURIA REV: CPT | Performed by: PHYSICIAN ASSISTANT

## 2021-02-19 PROCEDURE — 3008F BODY MASS INDEX DOCD: CPT | Performed by: PHYSICIAN ASSISTANT

## 2021-02-19 PROCEDURE — 1036F TOBACCO NON-USER: CPT | Performed by: PHYSICIAN ASSISTANT

## 2021-02-19 PROCEDURE — 82043 UR ALBUMIN QUANTITATIVE: CPT | Performed by: PHYSICIAN ASSISTANT

## 2021-02-19 PROCEDURE — 99214 OFFICE O/P EST MOD 30 MIN: CPT | Performed by: PHYSICIAN ASSISTANT

## 2021-02-19 PROCEDURE — 3725F SCREEN DEPRESSION PERFORMED: CPT | Performed by: PHYSICIAN ASSISTANT

## 2021-02-19 RX ORDER — AMMONIUM LACTATE 12 G/100G
CREAM TOPICAL AS NEEDED
Qty: 385 G | Refills: 1 | Status: SHIPPED | OUTPATIENT
Start: 2021-02-19 | End: 2021-07-30 | Stop reason: SDUPTHER

## 2021-02-19 RX ORDER — MELOXICAM 15 MG/1
15 TABLET ORAL DAILY
Qty: 30 TABLET | Refills: 1 | Status: SHIPPED | OUTPATIENT
Start: 2021-02-19

## 2021-02-19 RX ORDER — SIMVASTATIN 40 MG
40 TABLET ORAL
Qty: 90 TABLET | Refills: 1 | Status: SHIPPED | OUTPATIENT
Start: 2021-02-19 | End: 2021-06-18 | Stop reason: SDUPTHER

## 2021-02-19 NOTE — ASSESSMENT & PLAN NOTE
He has a history urethral stricture  PSAs due this month  Will recheck that and will need to follow-up with urology

## 2021-02-19 NOTE — ASSESSMENT & PLAN NOTE
Lab Results   Component Value Date    HGBA1C 6 9 (A) 02/19/2021   Continue with metformin 500 BID  Foot exam does show callus today  DM IRIS exam performed   Follow up in 5 months

## 2021-02-19 NOTE — PROGRESS NOTES
Assessment/Plan:    Type 2 diabetes mellitus without complication, without long-term current use of insulin (HCC)    Lab Results   Component Value Date    HGBA1C 6 9 (A) 02/19/2021   Continue with metformin 500 BID  Foot exam does show callus today  DM IRIS exam performed  Follow up in 5 months    Cystic thyroid nodule  Ultrasound ordered for recheck  He has been stable  Urinary hesitancy  He has a history urethral stricture  PSAs due this month  Will recheck that and will need to follow-up with urology  Problem List Items Addressed This Visit        Endocrine    Cystic thyroid nodule     Ultrasound ordered for recheck  He has been stable  Relevant Orders    US thyroid       Other    Urinary hesitancy     He has a history urethral stricture  PSAs due this month  Will recheck that and will need to follow-up with urology  Relevant Orders    PSA, Total Screen      Other Visit Diagnoses     Controlled type 2 diabetes mellitus without complication, without long-term current use of insulin ([address] )    -  Primary    Relevant Medications    ammonium lactate (LAC-HYDRIN) 12 % cream    metFORMIN (GLUCOPHAGE) 500 mg tablet    Other Relevant Orders    POCT hemoglobin A1c (Completed)    Microalbumin / creatinine urine ratio    IRIS Diabetic eye exam    Comprehensive metabolic panel    CBC and differential    Chronic bilateral low back pain without sciatica        Relevant Medications    meloxicam (MOBIC) 15 mg tablet    Other Relevant Orders    MRI lumbar spine wo contrast    Abnormal blood sugar        Relevant Medications    simvastatin (ZOCOR) 40 mg tablet            Subjective:      Patient ID: Rebekah Elmore is a 54 y o  male  HPI 51-year-old male here for follow-up diabetes  Is been years since he has last been seen for this  He has been taking metformin  Nine vision complaints  Denies any wounds of his feet or any numbness or tingling      He did have kidney stone surgery and urethral stricture  He was doing well after surgery but he has begun getting some issues with urination    It is normal at first and then it is ddribbling out and the stream slows down  No dysuria no abdominal pain  He does do a lot of heavy lifting at work  Pain is usually right lower back  He does have a history of middle lower back pain for years  He has tried physical therapy  Has also tried exercises to help  He does take ibuprofen and Tylenol and they do not help her have the patient is not typically radiate down his legs  He does have problems were sometimes he needs to have his wife help him up stairs  The following portions of the patient's history were reviewed and updated as appropriate:   He  has a past medical history of Chronic pain disorder, Closed fracture of fibula, Dental disorder, Fracture of ankle, and Hyperlipidemia  He   Patient Active Problem List    Diagnosis Date Noted    Urinary hesitancy 02/19/2021    Chronic upper back pain 02/02/2020    Type 2 diabetes mellitus without complication, without long-term current use of insulin ([address] ) 11/08/2018    Ureteral stone 09/08/2018    Ureterolithiasis 09/08/2018    Urethral stricture 09/08/2018    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    Cystic thyroid nodule 10/06/2016    Hyperlipidemia 04/03/2014    External hemorrhoids 08/27/2013     He  has a past surgical history that includes Knee surgery; Colonoscopy; Fracture surgery (Left); pr colonoscopy flx dx w/collj spec when pfrmd (N/A, 5/8/2018); and pr cysto/uretero w/lithotripsy &indwell stent insrt (Left, 9/8/2018)  His family history includes Thyroid cancer in his maternal grandmother, maternal uncle, and sister  He  reports that he has never smoked  He has never used smokeless tobacco  He reports that he does not drink alcohol or use drugs    Current Outpatient Medications   Medication Sig Dispense Refill    diazepam (VALIUM) 10 mg tablet Take 1 tablet (10 mg total) by mouth once as needed for anxiety for up to 30 doses 30 tablet 0    hydrocortisone (ANUSOL-HC, PROCTOSOL HC,) 2 5 % rectal cream Insert into the rectum 2 (two) times a day For up to 5 days if needed 30 g 0    metFORMIN (GLUCOPHAGE) 500 mg tablet Take 1 tablet (500 mg total) by mouth 2 (two) times a day with meals 180 tablet 1    simvastatin (ZOCOR) 40 mg tablet Take 1 tablet (40 mg total) by mouth daily at bedtime 90 tablet 1    ammonium lactate (LAC-HYDRIN) 12 % cream Apply topically as needed for dry skin 385 g 1    meloxicam (MOBIC) 15 mg tablet Take 1 tablet (15 mg total) by mouth daily 30 tablet 1     No current facility-administered medications for this visit  Current Outpatient Medications on File Prior to Visit   Medication Sig    diazepam (VALIUM) 10 mg tablet Take 1 tablet (10 mg total) by mouth once as needed for anxiety for up to 30 doses    hydrocortisone (ANUSOL-HC, PROCTOSOL HC,) 2 5 % rectal cream Insert into the rectum 2 (two) times a day For up to 5 days if needed    [DISCONTINUED] metFORMIN (GLUCOPHAGE) 500 mg tablet Take 1 tablet (500 mg total) by mouth 2 (two) times a day with meals    [DISCONTINUED] simvastatin (ZOCOR) 40 mg tablet Take 1 tablet (40 mg total) by mouth daily at bedtime     No current facility-administered medications on file prior to visit  He has No Known Allergies       Review of Systems   Constitutional: Negative for activity change, appetite change, fatigue, fever and unexpected weight change  HENT: Negative for ear pain, hearing loss and sore throat  Eyes: Negative for visual disturbance  Respiratory: Negative for cough and wheezing  Gastrointestinal: Negative for abdominal pain, constipation, diarrhea and vomiting  Genitourinary: Negative for difficulty urinating and dysuria  Hesitancy     Musculoskeletal: Positive for back pain  Negative for arthralgias and myalgias  Skin: Negative for rash     Neurological: Negative for dizziness and headaches  Psychiatric/Behavioral: Negative for behavioral problems  Objective:      /78 (BP Location: Left arm, Patient Position: Sitting, Cuff Size: Standard)   Pulse 81   Temp (!) 97 2 °F (36 2 °C) (Temporal)   Resp 16   Ht 5' 9" (1 753 m)   Wt 68 9 kg (152 lb)   SpO2 97%   BMI 22 45 kg/m²          Physical Exam  Vitals signs and nursing note reviewed  Constitutional:       General: He is not in acute distress  Appearance: He is well-developed  HENT:      Head: Normocephalic and atraumatic  Right Ear: External ear normal       Left Ear: External ear normal    Eyes:      Conjunctiva/sclera: Conjunctivae normal    Neck:      Musculoskeletal: Normal range of motion and neck supple  Thyroid: No thyromegaly  Cardiovascular:      Rate and Rhythm: Normal rate and regular rhythm  Pulses:           Dorsalis pedis pulses are 2+ on the right side and 2+ on the left side  Posterior tibial pulses are 2+ on the right side and 2+ on the left side  Heart sounds: Normal heart sounds  No murmur  Pulmonary:      Effort: Pulmonary effort is normal  No respiratory distress  Breath sounds: Normal breath sounds  No wheezing  Musculoskeletal:         General: Tenderness (L3-L5) present  No swelling or deformity  Comments: Decreased rom lumbar spine     Feet:      Right foot:      Skin integrity: Callus present  No ulcer, skin breakdown, erythema, warmth or dry skin  Left foot:      Skin integrity: Callus present  No ulcer, skin breakdown, erythema, warmth or dry skin  Lymphadenopathy:      Cervical: No cervical adenopathy  Neurological:      Mental Status: He is alert and oriented to person, place, and time  Psychiatric:         Mood and Affect: Mood normal          Behavior: Behavior normal        Patient's shoes and socks removed  Right Foot/Ankle   Right Foot Inspection  Skin Exam: skin normal, skin intact, callus and callus no dry skin, no warmth, no erythema, no maceration, no abnormal color, no pre-ulcer and no ulcer                          Toe Exam: ROM and strength within normal limits  Sensory   Vibration: intact    Monofilament testing: intact  Vascular    The right DP pulse is 2+  The right PT pulse is 2+  Left Foot/Ankle  Left Foot Inspection  Skin Exam: skin normal, skin intact and callusno dry skin, no warmth, no erythema, no maceration, normal color, no pre-ulcer and no ulcer                         Toe Exam: ROM and strength within normal limits                   Sensory   Vibration: intact    Monofilament: intact  Vascular    The left DP pulse is 2+  The left PT pulse is 2+  Assign Risk Category:  No deformity present;  No loss of protective sensation;        Risk: 0

## 2021-02-22 LAB
LEFT EYE DIABETIC RETINOPATHY: NORMAL
LEFT EYE IMAGE QUALITY: NORMAL
LEFT EYE MACULAR EDEMA: NORMAL
LEFT EYE OTHER RETINOPATHY: NORMAL
RIGHT EYE DIABETIC RETINOPATHY: NORMAL
RIGHT EYE IMAGE QUALITY: NORMAL
RIGHT EYE MACULAR EDEMA: NORMAL
RIGHT EYE OTHER RETINOPATHY: NORMAL
SEVERITY (EYE EXAM): NORMAL

## 2021-02-22 PROCEDURE — 2025F 7 FLD RTA PHOTO W/O RTNOPTHY: CPT | Performed by: PHYSICIAN ASSISTANT

## 2021-02-26 ENCOUNTER — LAB (OUTPATIENT)
Dept: LAB | Age: 56
End: 2021-02-26
Payer: COMMERCIAL

## 2021-02-26 DIAGNOSIS — E11.9 CONTROLLED TYPE 2 DIABETES MELLITUS WITHOUT COMPLICATION, WITHOUT LONG-TERM CURRENT USE OF INSULIN (HCC): ICD-10-CM

## 2021-02-26 DIAGNOSIS — R39.11 URINARY HESITANCY: ICD-10-CM

## 2021-02-26 LAB
ALBUMIN SERPL BCP-MCNC: 4 G/DL (ref 3.5–5)
ALP SERPL-CCNC: 69 U/L (ref 46–116)
ALT SERPL W P-5'-P-CCNC: 35 U/L (ref 12–78)
ANION GAP SERPL CALCULATED.3IONS-SCNC: 1 MMOL/L (ref 4–13)
AST SERPL W P-5'-P-CCNC: 21 U/L (ref 5–45)
BASOPHILS # BLD AUTO: 0.02 THOUSANDS/ΜL (ref 0–0.1)
BASOPHILS NFR BLD AUTO: 1 % (ref 0–1)
BILIRUB SERPL-MCNC: 0.49 MG/DL (ref 0.2–1)
BUN SERPL-MCNC: 12 MG/DL (ref 5–25)
CALCIUM SERPL-MCNC: 9.6 MG/DL (ref 8.3–10.1)
CHLORIDE SERPL-SCNC: 108 MMOL/L (ref 100–108)
CO2 SERPL-SCNC: 31 MMOL/L (ref 21–32)
CREAT SERPL-MCNC: 0.85 MG/DL (ref 0.6–1.3)
EOSINOPHIL # BLD AUTO: 0.18 THOUSAND/ΜL (ref 0–0.61)
EOSINOPHIL NFR BLD AUTO: 5 % (ref 0–6)
ERYTHROCYTE [DISTWIDTH] IN BLOOD BY AUTOMATED COUNT: 13.2 % (ref 11.6–15.1)
GFR SERPL CREATININE-BSD FRML MDRD: 98 ML/MIN/1.73SQ M
GLUCOSE P FAST SERPL-MCNC: 112 MG/DL (ref 65–99)
HCT VFR BLD AUTO: 43.3 % (ref 36.5–49.3)
HGB BLD-MCNC: 14.5 G/DL (ref 12–17)
IMM GRANULOCYTES # BLD AUTO: 0.01 THOUSAND/UL (ref 0–0.2)
IMM GRANULOCYTES NFR BLD AUTO: 0 % (ref 0–2)
LYMPHOCYTES # BLD AUTO: 1.11 THOUSANDS/ΜL (ref 0.6–4.47)
LYMPHOCYTES NFR BLD AUTO: 30 % (ref 14–44)
MCH RBC QN AUTO: 29.9 PG (ref 26.8–34.3)
MCHC RBC AUTO-ENTMCNC: 33.5 G/DL (ref 31.4–37.4)
MCV RBC AUTO: 89 FL (ref 82–98)
MONOCYTES # BLD AUTO: 0.37 THOUSAND/ΜL (ref 0.17–1.22)
MONOCYTES NFR BLD AUTO: 10 % (ref 4–12)
NEUTROPHILS # BLD AUTO: 2.01 THOUSANDS/ΜL (ref 1.85–7.62)
NEUTS SEG NFR BLD AUTO: 54 % (ref 43–75)
NRBC BLD AUTO-RTO: 0 /100 WBCS
PLATELET # BLD AUTO: 268 THOUSANDS/UL (ref 149–390)
PMV BLD AUTO: 10.7 FL (ref 8.9–12.7)
POTASSIUM SERPL-SCNC: 4.3 MMOL/L (ref 3.5–5.3)
PROT SERPL-MCNC: 7.5 G/DL (ref 6.4–8.2)
PSA SERPL-MCNC: 1.3 NG/ML (ref 0–4)
RBC # BLD AUTO: 4.85 MILLION/UL (ref 3.88–5.62)
SODIUM SERPL-SCNC: 140 MMOL/L (ref 136–145)
WBC # BLD AUTO: 3.7 THOUSAND/UL (ref 4.31–10.16)

## 2021-02-26 PROCEDURE — 80053 COMPREHEN METABOLIC PANEL: CPT

## 2021-02-26 PROCEDURE — 36415 COLL VENOUS BLD VENIPUNCTURE: CPT

## 2021-02-26 PROCEDURE — G0103 PSA SCREENING: HCPCS

## 2021-02-26 PROCEDURE — 85025 COMPLETE CBC W/AUTO DIFF WBC: CPT

## 2021-06-18 DIAGNOSIS — E11.9 CONTROLLED TYPE 2 DIABETES MELLITUS WITHOUT COMPLICATION, WITHOUT LONG-TERM CURRENT USE OF INSULIN (HCC): ICD-10-CM

## 2021-06-18 DIAGNOSIS — R73.09 ABNORMAL BLOOD SUGAR: ICD-10-CM

## 2021-06-21 RX ORDER — SIMVASTATIN 40 MG
40 TABLET ORAL
Qty: 90 TABLET | Refills: 1 | Status: SHIPPED | OUTPATIENT
Start: 2021-06-21 | End: 2021-11-14 | Stop reason: SDUPTHER

## 2021-07-30 ENCOUNTER — OFFICE VISIT (OUTPATIENT)
Dept: FAMILY MEDICINE CLINIC | Facility: CLINIC | Age: 56
End: 2021-07-30

## 2021-07-30 VITALS
TEMPERATURE: 97.6 F | BODY MASS INDEX: 22.19 KG/M2 | SYSTOLIC BLOOD PRESSURE: 122 MMHG | RESPIRATION RATE: 18 BRPM | HEIGHT: 69 IN | OXYGEN SATURATION: 99 % | WEIGHT: 149.8 LBS | HEART RATE: 70 BPM | DIASTOLIC BLOOD PRESSURE: 68 MMHG

## 2021-07-30 DIAGNOSIS — E11.9 CONTROLLED TYPE 2 DIABETES MELLITUS WITHOUT COMPLICATION, WITHOUT LONG-TERM CURRENT USE OF INSULIN (HCC): ICD-10-CM

## 2021-07-30 DIAGNOSIS — E11.9 TYPE 2 DIABETES MELLITUS WITHOUT COMPLICATION, WITHOUT LONG-TERM CURRENT USE OF INSULIN (HCC): Primary | ICD-10-CM

## 2021-07-30 DIAGNOSIS — K64.4 EXTERNAL HEMORRHOIDS: ICD-10-CM

## 2021-07-30 DIAGNOSIS — M79.671 RIGHT FOOT PAIN: ICD-10-CM

## 2021-07-30 DIAGNOSIS — R39.11 URINARY HESITANCY: ICD-10-CM

## 2021-07-30 DIAGNOSIS — Z87.448 HISTORY OF URETHRAL STRICTURE: ICD-10-CM

## 2021-07-30 LAB
SL AMB  POCT GLUCOSE, UA: NORMAL
SL AMB LEUKOCYTE ESTERASE,UA: NORMAL
SL AMB POCT BILIRUBIN,UA: NORMAL
SL AMB POCT BLOOD,UA: NORMAL
SL AMB POCT CLARITY,UA: NORMAL
SL AMB POCT COLOR,UA: YELLOW
SL AMB POCT HEMOGLOBIN AIC: 8.1 (ref ?–6.5)
SL AMB POCT KETONES,UA: NORMAL
SL AMB POCT NITRITE,UA: NORMAL
SL AMB POCT PH,UA: 5
SL AMB POCT SPECIFIC GRAVITY,UA: 1.02
SL AMB POCT URINE PROTEIN: NORMAL
SL AMB POCT UROBILINOGEN: NORMAL

## 2021-07-30 PROCEDURE — 83036 HEMOGLOBIN GLYCOSYLATED A1C: CPT | Performed by: PHYSICIAN ASSISTANT

## 2021-07-30 PROCEDURE — 3052F HG A1C>EQUAL 8.0%<EQUAL 9.0%: CPT | Performed by: PHYSICIAN ASSISTANT

## 2021-07-30 PROCEDURE — 81002 URINALYSIS NONAUTO W/O SCOPE: CPT | Performed by: PHYSICIAN ASSISTANT

## 2021-07-30 PROCEDURE — 99214 OFFICE O/P EST MOD 30 MIN: CPT | Performed by: PHYSICIAN ASSISTANT

## 2021-07-30 PROCEDURE — 3061F NEG MICROALBUMINURIA REV: CPT | Performed by: PHYSICIAN ASSISTANT

## 2021-07-30 PROCEDURE — 1036F TOBACCO NON-USER: CPT | Performed by: PHYSICIAN ASSISTANT

## 2021-07-30 PROCEDURE — 3008F BODY MASS INDEX DOCD: CPT | Performed by: PHYSICIAN ASSISTANT

## 2021-07-30 RX ORDER — AMMONIUM LACTATE 12 G/100G
CREAM TOPICAL AS NEEDED
Qty: 385 G | Refills: 1 | Status: SHIPPED | OUTPATIENT
Start: 2021-07-30 | End: 2021-11-14 | Stop reason: SDUPTHER

## 2021-07-30 RX ORDER — HYDROCORTISONE 25 MG/G
CREAM TOPICAL 2 TIMES DAILY
Qty: 28 G | Refills: 1 | Status: SHIPPED | OUTPATIENT
Start: 2021-07-30

## 2021-07-30 NOTE — PROGRESS NOTES
Assessment/Plan:    Type 2 diabetes mellitus without complication, without long-term current use of insulin (HCC)    Lab Results   Component Value Date    HGBA1C 8 1 (A) 07/30/2021   start metformin 500 in am and 1000 in PM   Recommend dietary changes and return in 3 months  To add 2nd med if no change  Problem List Items Addressed This Visit        Digestive    External hemorrhoids    Relevant Medications    hydrocortisone (ANUSOL-HC) 2 5 % rectal cream       Endocrine    Type 2 diabetes mellitus without complication, without long-term current use of insulin (HCC) - Primary       Lab Results   Component Value Date    HGBA1C 8 1 (A) 07/30/2021   start metformin 500 in am and 1000 in PM   Recommend dietary changes and return in 3 months  To add 2nd med if no change  Relevant Medications    metFORMIN (GLUCOPHAGE) 500 mg tablet    Other Relevant Orders    POCT hemoglobin A1c (Completed)       Other    Urinary hesitancy    Relevant Orders    POCT urine dip (Completed)    Ambulatory referral to Urology      Other Visit Diagnoses     Controlled type 2 diabetes mellitus without complication, without long-term current use of insulin (HCC)        Relevant Medications    metFORMIN (GLUCOPHAGE) 500 mg tablet    ammonium lactate (LAC-HYDRIN) 12 % cream    History of urethral stricture        Relevant Orders    Ambulatory referral to Urology    Right foot pain        Relevant Orders    XR foot 3+ vw right        had both covid vaccines    Subjective:      Patient ID: Betzaida Rodriguez is a 64 y o  male  HPI  64year old M here for follow up of diabetes  He is taking metofrmin BID 500mg  He tried taking 1000 metformin at night but he hadnt taken it in the AM because he does not eat much at work  He was on vacation though and had been off his diet    When he is urinating and finishing  He is still dripping the urine  He did have 5mm renal stone in the past and stricture requiring surgery in 2018    He wakes up 2-3 times at night to urinate  No abdomen pain but he needs to push to urinate at times  He has had this for 7 months  He also toney been having bilateral foot pain worse on right  This has been months  No swelling  It is worse at work  Wife massages his feet with liquid acetaminophen and it helps  The following portions of the patient's history were reviewed and updated as appropriate:   He  has a past medical history of Chronic pain disorder, Closed fracture of fibula, Dental disorder, Fracture of ankle, and Hyperlipidemia  He   Patient Active Problem List    Diagnosis Date Noted    Urinary hesitancy 02/19/2021    Chronic upper back pain 02/02/2020    Type 2 diabetes mellitus without complication, without long-term current use of insulin ([facility] [address] ) 11/08/2018    Ureteral stone 09/08/2018    Ureterolithiasis 09/08/2018    Urethral stricture 09/08/2018    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    Cystic thyroid nodule 10/06/2016    Hyperlipidemia 04/03/2014    External hemorrhoids 08/27/2013     He  has a past surgical history that includes Knee surgery; Colonoscopy; Fracture surgery (Left); pr colonoscopy flx dx w/collj spec when pfrmd (N/A, 5/8/2018); and pr cysto/uretero w/lithotripsy &indwell stent insrt (Left, 9/8/2018)  His family history includes Thyroid cancer in his maternal grandmother, maternal uncle, and sister  He  reports that he has never smoked  He has never used smokeless tobacco  He reports that he does not drink alcohol and does not use drugs    Current Outpatient Medications   Medication Sig Dispense Refill    meloxicam (MOBIC) 15 mg tablet Take 1 tablet (15 mg total) by mouth daily 30 tablet 1    metFORMIN (GLUCOPHAGE) 500 mg tablet Take 1 tablet in the morning and 2 at night 270 tablet 1    simvastatin (ZOCOR) 40 mg tablet Take 1 tablet (40 mg total) by mouth daily at bedtime 90 tablet 1    ammonium lactate (LAC-HYDRIN) 12 % cream Apply topically as needed for dry skin 385 g 1    hydrocortisone (ANUSOL-HC) 2 5 % rectal cream Apply topically 2 (two) times a day 28 g 1     No current facility-administered medications for this visit  Current Outpatient Medications on File Prior to Visit   Medication Sig    meloxicam (MOBIC) 15 mg tablet Take 1 tablet (15 mg total) by mouth daily    simvastatin (ZOCOR) 40 mg tablet Take 1 tablet (40 mg total) by mouth daily at bedtime    [DISCONTINUED] hydrocortisone (ANUSOL-HC, PROCTOSOL HC,) 2 5 % rectal cream Insert into the rectum 2 (two) times a day For up to 5 days if needed    [DISCONTINUED] metFORMIN (GLUCOPHAGE) 500 mg tablet Take 1 tablet (500 mg total) by mouth 2 (two) times a day with meals    [DISCONTINUED] ammonium lactate (LAC-HYDRIN) 12 % cream Apply topically as needed for dry skin    [DISCONTINUED] diazepam (VALIUM) 10 mg tablet Take 1 tablet (10 mg total) by mouth once as needed for anxiety for up to 30 doses (Patient not taking: Reported on 7/30/2021)     No current facility-administered medications on file prior to visit  He has No Known Allergies       Review of Systems   Constitutional: Negative for activity change, appetite change, fatigue, fever and unexpected weight change  HENT: Negative for dental problem, ear pain, hearing loss and sore throat  Eyes: Negative for visual disturbance  Respiratory: Negative for cough and wheezing  Gastrointestinal: Negative for abdominal pain, constipation, diarrhea and vomiting  Genitourinary: Negative for difficulty urinating and dysuria  Hesitancy, nocturia   Musculoskeletal: Negative for arthralgias, back pain and myalgias  Foot pain   Skin: Negative for rash  Neurological: Negative for dizziness and headaches  Psychiatric/Behavioral: Negative for behavioral problems           Objective:      /68 (BP Location: Right arm, Patient Position: Sitting, Cuff Size: Standard)   Pulse 70   Temp 97 6 °F (36 4 °C) (Temporal)   Resp 18   Ht 5' 9" (1 753 m)   Wt 67 9 kg (149 lb 12 8 oz)   SpO2 99%   BMI 22 12 kg/m²          Physical Exam  Vitals and nursing note reviewed  Constitutional:       General: He is not in acute distress  Appearance: He is well-developed  HENT:      Head: Normocephalic and atraumatic  Right Ear: External ear normal       Left Ear: External ear normal       Nose: Nose normal    Eyes:      Conjunctiva/sclera: Conjunctivae normal    Neck:      Thyroid: No thyromegaly  Cardiovascular:      Rate and Rhythm: Normal rate and regular rhythm  Pulses:           Dorsalis pedis pulses are 2+ on the right side and 2+ on the left side  Posterior tibial pulses are 2+ on the right side and 2+ on the left side  Heart sounds: Normal heart sounds  No murmur heard  Pulmonary:      Effort: Pulmonary effort is normal  No respiratory distress  Breath sounds: Normal breath sounds  No wheezing  Abdominal:      General: Bowel sounds are normal       Palpations: Abdomen is soft  There is no mass  Tenderness: There is no abdominal tenderness  There is no guarding  Musculoskeletal:         General: Normal range of motion  Cervical back: Normal range of motion and neck supple  Feet:    Feet:      Right foot:      Skin integrity: No ulcer, skin breakdown, erythema, warmth, callus or dry skin  Left foot:      Skin integrity: No ulcer, skin breakdown, erythema, warmth, callus or dry skin  Lymphadenopathy:      Cervical: No cervical adenopathy  Skin:     General: Skin is warm  Neurological:      Mental Status: He is alert and oriented to person, place, and time  Psychiatric:         Mood and Affect: Mood normal          Behavior: Behavior normal        Patient's shoes and socks removed  Right Foot/Ankle   Right Foot Inspection  Skin Exam: skin normal and skin intact no dry skin, no warmth, no callus, no erythema, no maceration, no abnormal color, no pre-ulcer, no ulcer and no callus Toe Exam: ROM and strength within normal limits  Sensory       Monofilament testing: intact  Vascular    The right DP pulse is 2+  The right PT pulse is 2+  Left Foot/Ankle  Left Foot Inspection  Skin Exam: skin normal and skin intactno dry skin, no warmth, no erythema, no maceration, normal color, no pre-ulcer, no ulcer and no callus                         Toe Exam: ROM and strength within normal limits                   Sensory       Monofilament: intact  Vascular    The left DP pulse is 2+  The left PT pulse is 2+  Assign Risk Category:  No deformity present;  No loss of protective sensation;        Risk: 0

## 2021-07-30 NOTE — PATIENT INSTRUCTIONS
Planificación de comidas con intercambios diabéticos   LO QUE NECESITA SABER:   Los intercambios son porciones de comida que contienen cantidades similares de carbohidratos, grasas, proteínas y calorías dentro de un jv alimenticio  Los intercambios pueden ser [employer] para desarrollar un plan alimenticio saludable que lo ayude a mantener el azúcar en la frantz dentro de los niveles recomendados  Un plan alimenticio con la cantidad de carbohidratos correcta es especialmente importante  El azúcar en frazier frantz se eleva naturalmente después de que usted consume carbohidratos  Consumir demasiados carbohidratos en 1 micheal comida o merienda puede elevar frazier nivel de azúcar en la frantz  Los carbohidratos se [employer], frutas, [place], yogur y [place]  INSTRUCCIONES SOBRE [facility]ITAL[facility]:   Llame a frazier médico si:  · Usted presenta niveles altos de azúcar en la frantz a cierta hora del día o la mayor parte del [place]  · Usted tiene bajos niveles de azúcar en frazier frantz frecuentemente  · Usted tiene preguntas o inquietudes acerca de frazier condición o cuidado  Vesna un plan de alimentación con intercambios: Un dietista colaborará con usted para desarrollar un plan alimenticio saludable y adecuado  Irene plan alimenticio incluirá la cantidad de intercambios que usted puede tener de cada jv alimenticio mihir el día  Siga el plan alimenticio manteniendo un registro de la cantidad de intercambios que usted come en cada comida y [place]  Frazier plan alimenticio será basado en frazier edad, peso, niveles de [employer], medicamentos y [place] de [place]  Intercambios de grupos de alimentos con almidón: Cada intercambio en la lista, contiene aproximadamente 15 gramos de carbohidratos , 3 gramos de proteína, 1 gramo de [place] y [de-identified] calorías    · 1 onza de pan tong, integral o de orr (1 [place])    · 1 onza de rosquilla (alrededor de 1/4 de la rosquilla)    · 1 tortilla de harina o de maíz de 6 pulgadas o 1 panqueque de 4 pulgadas (aproximadamente de ¼ de pulgada de grueso)    · ? de taza de pasta o arroz cocidos    · ¾ de taza de cereal seco, listo para comer y sin azúcar agregada    · ½ de taza de cereal cocido, pattie la josé    · 3 galletas de harina cuadradas u 8 galletas en forma de animalitos    · 6 galletas saladas o    · 3 tazas de palomitas de maíz o ¾ onzas de pretzels    · Vegetales con almidón y legumbres cocidas:     ? ½ de taza de maíz, chícharos verdes, camote o puré de papa    ? ¼ de clayton papa janis horneada    ? [address], [place] moscada, o calabaza    ? ½ de taza de frijoles, lentejas o ejote (pattie el gunn, habichuela o de ayala rosetta)    ? ? taza de frijol tipo lima    Intercambios del jv de las frutas: Cada intercambio contiene aproximadamente 15 gramos de carbohidratos  y 61 calorías  · 1 manzana, plátano, naranja o nectarina pequeña (4 onzas)    · ½ taza de fruta enlatada o fresca    · ½ taza (4 onzas) de jugo de fruta sin azúcar    · 2 cucharadas de fruta seca    Intercambios del jv de los lácteos: Cada intercambio contiene aproximadamente 12 gramos de carbohidratos  y 8 gramos de proteína  La cantidad de grasa y calorías en cada porción depende del tipo de lácteo (pattie entero, bajo en grasa o sin grasa)  · 1 taza de leche sin grasa o baja en grasa    · ¾ de taza de yogur natural sin grasa    · 1 taza de yogur sin grasa, endulzado con edulcorante artificial (sin calorías)    Intercambios de jv de verduras sin almidón: Cada intercambio contiene aproximadamente 5 gramos de carbohidratos , 2 gramos de proteína y 25 calorías  Los ejemplos incluyen al betabel, brócoli, repollo, zanahorias, coliflor, pepino, hongos, tomate y calab[place]    · ½ de taza de verdura cocida o 1 taza de verdura cruda    · ½ taza de jugo de verduras    Intercambios del jv de la carne y sustituto de la carne: Cada intercambio de carne magra  de la siguiente lista contiene aproximadamente 7 gramos de proteína, de 0 a 3 gramos de grasa y 39 calorías  El jv de alimentos de la carne y los sustitutos de la carne no contienen carbohidratos  Las gabriele con medio o alto contenido de grasa contienen más calorías  · 1 onza de elvin o pavo sin piel, o 1 onza de pescado (sin empanizar o freír)    · 1 onza de carne magra de res, puerco o ortiz    · 1 cubo de 1 pulgada o 1 onza de queso bajo en grasa    · 2 claras de huevo o ¼ de taza de sustituto de huevo    · ½ taza de tofú    Intercambios del jv de los [place], postres y otros carbohidratos:  · Dulces y otros postres: Cada intercambio contiene aproximadamente 15 gramos de carbohidratos   ? 1 onza de pastel de james o [place] de 2 pulgadas de pastel (sin glaseado)    ? 2 galletas dulces pequeñas    ? ½ taza de helado, sin azúcar y [place]    ? 1 cucharada de Taryn Arin, [place], Gabe, azúcar de harris o miel    · Combinación de alimentos:     ? 1 taza de clayton comida preparada, pattie la [employer], espaguetis con albóndigas, macarrones con Rubina-barre, frijoles con chile (cada porción cuenta pattie 2 intercambios de carbohidratos )    ? 1 taza de caldo de tomate o de vegetales con carne (cada porción cuenta pattie 1 intercambio de carbohidratos )    Intercambios del jv de las grasas: Cada intercambio contiene 5 gramos de grasa y 39 calorías    · 1 cucharadita de aceite (ptatie el aceite de canola, rand y [place])    · 6 almendras o anacardos, 10 cacahuates o 4 mitades de nuez    · 2 cucharadas de aguacate    · ½ cucharada de crema de cacahuate    · 1 cucharadita de margarina regular o 2 cucharaditas de margarina baja en grasa    · 1 cucharadita de mantequilla regular o 1 cucharada de mantequilla baja en grasa    · 1 cucharadita de [place] regular o 1 cucharada mayonesa baja en grasa    · 1 cucharada de aderezo de ensalada regular o 2 cucharadas de aderezo para ensalada bajo en grasa    Alimentos de [place] elección: Los alimentos de esta lista se conocen pattie alimentos de [place] elección ya que tienen pocas calorías  Los alimentos de leidy elección usualmente no elevan el azúcar en johnson frantz si usted los limita  · 1 cucharada de salsa de tomate o salsa para taco    · ¼ taza de salsa    · 2 cucharadas de jarabe sin azúcar o 2 cucharaditas de mermelada o jalea baja en grasa    · 1 cucharada de aderezo para ensalada sin grasa    · 4 cucharadas de Bryson o [place] sin grasa    · Bebidas sin azúcar: soda de dieta, mezcla para bebidas sin azúcar o agua mineral    · Consomé bajo en sal o caldo sin grasa    · [place]    · Condimentos pattie especias, hierbas y ajo    · [place] sin azúcar sin fruta agregada    Otras pautas de nutrición saludable debería seguir:  · Limite las bebidas con sustitutos del azúcar  Johnson dietista o profesional de la dajuan le recomendarán que tigist agua  El agua ayuda a que los riñones funcionen correctamente  Consulte cuál es la cantidad de agua que usted debe maddi por día  · [employer]  Elija alimentos que son buenas griffin de fibra pattie frutas, vegetales y granos enteros  Los [employer] contienen 5 o más gramos de [place] por porción son Candace Ornelasela griffin de Jaquelin  Las legumbres pattie garbanzo, frijol gunn, habichuela y [address] [place] [address] contienen [place]  · Limite la grasa  Pregunte a johnson dietista o médico cuánta grasa usted debería consumir diariamente  Elija alimentos bajos en grasa, bajos en grasa saturada, grasas trans y colesterol  [place] son el pavo o elvin sin la piel, pescado, sampson de carne que no contienen Belinda Manny y frijoles  También, los productos lácteos que son bajos en grasa pattie Pili Fuse y el yogur bajo en grasa y Pili Fuse sin Raynaldo Sables son [place]  El ácido graso Omega 3 es grasa saludable que se encuentra en el aceite de canola, de soya y en el pescado con grasa  El salmón, el atún tong y la henry son buenas griffin de ácidos grasos omega 3   Consuma 2 porciones de estos tipos de pescado por semana  No coma pescado frito  · Limite el consumo de azúcar  Es necesario contar el azúcar y los dulces pattie parte de los intercambios de carbohidratos que usted puede tener en frazier plan alimenticio  Limite el azúcar y los dulces, porque usualmente son altos en calorías y Evelyn Sveta  Coma porciones más pequeñas de dulces compartiendo un postre o pidiendo clayton porción para niños en el restaurante  · Limitar el sodio (sal) alrededor de 2,300 mg por día  Es posible que usted necesite consumir incluso menos sodio si padece de ciertas condiciones médicas  Los alimentos que contienen mucho sodio incluyen la salsa de soya, ratna fritas y caldos  · Limite el consumo de alcohol  Pregunte a frazier médico si usted puede maddi alcohol  Si usted puede maddi alcohol, es importante comer cuando lo tome  Si usted dre alcohol con frazier estómago vacío, frazier nivel de azúcar en la frantz podría bajar demasiado  [place] de [address] y los hombres de 72 años o más deben limitar el consumo de alcohol a 1 bebida por día  Los hombres de [address] de alcohol a 2 tragos al día  Clayton bebida equivale a 5 onzas de vino, 12 onzas de cerveza o 1 onza y media de licor  Otras maneras de controlar frazier diabetes:  · Controle frazier nivel de azúcar en la frantz  Revise frazier nivel de azúcar en la frantz regularmente y [place] un registro de los [place]  Pregunte a frazier médico, cómo, cuándo y qué tan seguido necesita revisar el azúcar en frazier frantz  Es posible que necesite controlarse el nivel de azúcar en frazier frantz por lo menos 3 veces al día  · Hable con frazier médico sobre frazier peso  Pregúntele si debe adelgazar y cuánto peso debe perder  Si usted tiene sobrepeso, podría ser necesario hacer otros cambios para perder peso  Pida ayuda a frazier médico para crear un plan de pérdida de peso  · Realice actividad física regularmente  La actividad física puede ayudar a disminuir el nivel de azúcar en frazier frantz   Sandrine Alejandro puede ayudar a disminuir el riesgo de tener enfermedades cardíacas y ayudarle a perder peso  Los adultos deben realizar actividad física de intensidad moderada mihir al menos 150 minutos cada semana  Reparta la cantidad de actividad mihir al menos 3 días a la semana  No deje de realizarla mihir más de 2 días seguidos  Los niños deben hacer al menos 60 minutos de actividad física moderada la mayoría de los días de la [place]  Ejemplos de actividad física moderada incluyen caminar a paso ligero, correr y nadar  No se siente por más de 30 minutos  Colabore con frazier médico para crear un plan de [place]  © Copyright [employer] 2021 Information is for End User's use only and may not be sold, redistributed or otherwise used for commercial purposes  All illustrations and images included in CareNotes® are the copyrighted property of [employer]  or [address] [place] [address] es sólo para uso en educación  Frazier intención no es darle un consejo médico sobre enfermedades o tratamientos  Colsulte con frazier Ellis James farmacéutico antes de seguir cualquier régimen médico para saber si es seguro y efectivo para usted

## 2021-07-30 NOTE — ASSESSMENT & PLAN NOTE
Lab Results   Component Value Date    HGBA1C 8 1 (A) 07/30/2021   start metformin 500 in am and 1000 in PM   Recommend dietary changes and return in 3 months  To add 2nd med if no change

## 2021-11-12 ENCOUNTER — OFFICE VISIT (OUTPATIENT)
Dept: FAMILY MEDICINE CLINIC | Facility: CLINIC | Age: 56
End: 2021-11-12

## 2021-11-12 VITALS
BODY MASS INDEX: 21.83 KG/M2 | RESPIRATION RATE: 16 BRPM | DIASTOLIC BLOOD PRESSURE: 74 MMHG | SYSTOLIC BLOOD PRESSURE: 118 MMHG | HEIGHT: 69 IN | WEIGHT: 147.4 LBS | OXYGEN SATURATION: 97 % | TEMPERATURE: 98 F | HEART RATE: 84 BPM

## 2021-11-12 DIAGNOSIS — R73.09 ABNORMAL BLOOD SUGAR: ICD-10-CM

## 2021-11-12 DIAGNOSIS — E11.9 TYPE 2 DIABETES MELLITUS WITHOUT COMPLICATION, WITHOUT LONG-TERM CURRENT USE OF INSULIN (HCC): Primary | ICD-10-CM

## 2021-11-12 DIAGNOSIS — B35.3 TINEA PEDIS OF LEFT FOOT: ICD-10-CM

## 2021-11-12 DIAGNOSIS — E11.9 CONTROLLED TYPE 2 DIABETES MELLITUS WITHOUT COMPLICATION, WITHOUT LONG-TERM CURRENT USE OF INSULIN (HCC): ICD-10-CM

## 2021-11-12 DIAGNOSIS — E04.1 CYSTIC THYROID NODULE: ICD-10-CM

## 2021-11-12 DIAGNOSIS — R35.0 URINARY FREQUENCY: ICD-10-CM

## 2021-11-12 DIAGNOSIS — N35.919 STRICTURE OF MALE URETHRA, UNSPECIFIED STRICTURE TYPE: ICD-10-CM

## 2021-11-12 LAB — SL AMB POCT HEMOGLOBIN AIC: 7 (ref ?–6.5)

## 2021-11-12 PROCEDURE — 3078F DIAST BP <80 MM HG: CPT | Performed by: PHYSICIAN ASSISTANT

## 2021-11-12 PROCEDURE — 83036 HEMOGLOBIN GLYCOSYLATED A1C: CPT | Performed by: PHYSICIAN ASSISTANT

## 2021-11-12 PROCEDURE — 3074F SYST BP LT 130 MM HG: CPT | Performed by: PHYSICIAN ASSISTANT

## 2021-11-12 PROCEDURE — 3008F BODY MASS INDEX DOCD: CPT | Performed by: PHYSICIAN ASSISTANT

## 2021-11-12 PROCEDURE — 99214 OFFICE O/P EST MOD 30 MIN: CPT | Performed by: PHYSICIAN ASSISTANT

## 2021-11-12 PROCEDURE — 3051F HG A1C>EQUAL 7.0%<8.0%: CPT | Performed by: PHYSICIAN ASSISTANT

## 2021-11-12 PROCEDURE — 1036F TOBACCO NON-USER: CPT | Performed by: PHYSICIAN ASSISTANT

## 2021-11-12 RX ORDER — NYSTATIN AND TRIAMCINOLONE ACETONIDE 100000; 1 [USP'U]/G; MG/G
OINTMENT TOPICAL 2 TIMES DAILY
Qty: 30 G | Refills: 0 | Status: SHIPPED | OUTPATIENT
Start: 2021-11-12

## 2021-11-14 RX ORDER — AMMONIUM LACTATE 12 G/100G
CREAM TOPICAL AS NEEDED
Qty: 385 G | Refills: 1 | Status: SHIPPED | OUTPATIENT
Start: 2021-11-14 | End: 2022-05-13 | Stop reason: SDUPTHER

## 2021-11-14 RX ORDER — SIMVASTATIN 40 MG
40 TABLET ORAL
Qty: 90 TABLET | Refills: 1 | Status: SHIPPED | OUTPATIENT
Start: 2021-11-14 | End: 2022-04-22 | Stop reason: SDUPTHER

## 2021-12-03 ENCOUNTER — HOSPITAL ENCOUNTER (OUTPATIENT)
Dept: ULTRASOUND IMAGING | Facility: HOSPITAL | Age: 56
Discharge: HOME | End: 2021-12-03
Payer: COMMERCIAL

## 2021-12-03 DIAGNOSIS — E04.1 CYSTIC THYROID NODULE: ICD-10-CM

## 2021-12-03 PROCEDURE — 76536 US EXAM OF HEAD AND NECK: CPT

## 2022-02-02 ENCOUNTER — OFFICE VISIT (OUTPATIENT)
Dept: URGENT CARE | Age: 57
End: 2022-02-02
Payer: COMMERCIAL

## 2022-02-02 VITALS
HEIGHT: 68 IN | HEART RATE: 87 BPM | BODY MASS INDEX: 22.28 KG/M2 | TEMPERATURE: 97.7 F | WEIGHT: 147 LBS | OXYGEN SATURATION: 98 %

## 2022-02-02 DIAGNOSIS — B34.9 VIRAL SYNDROME: ICD-10-CM

## 2022-02-02 DIAGNOSIS — R50.9 FEVER, UNSPECIFIED FEVER CAUSE: Primary | ICD-10-CM

## 2022-02-02 PROCEDURE — 99213 OFFICE O/P EST LOW 20 MIN: CPT | Performed by: PHYSICIAN ASSISTANT

## 2022-02-02 PROCEDURE — U0005 INFEC AGEN DETEC AMPLI PROBE: HCPCS | Performed by: PHYSICIAN ASSISTANT

## 2022-02-02 PROCEDURE — U0003 INFECTIOUS AGENT DETECTION BY NUCLEIC ACID (DNA OR RNA); SEVERE ACUTE RESPIRATORY SYNDROME CORONAVIRUS 2 (SARS-COV-2) (CORONAVIRUS DISEASE [COVID-19]), AMPLIFIED PROBE TECHNIQUE, MAKING USE OF HIGH THROUGHPUT TECHNOLOGIES AS DESCRIBED BY CMS-2020-01-R: HCPCS | Performed by: PHYSICIAN ASSISTANT
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
## 2022-02-02 NOTE — PATIENT INSTRUCTIONS
Vitamin D3 2000 IU daily  Vitamin C 1000mg twice per day  Multivitamin daily  Fluids and rest  Over the counter cold medication as needed  Follow up with PCP in 3-5 days  Proceed to  ER if symptoms worsen  [address]    Your healthcare provider and/or public health staff have evaluated you and have determined that you do not need to remain in the hospital at this time  At this time you can be isolated at home where you will be monitored by staff from your local or state health department  You should carefully follow the prevention and isolation steps below until a healthcare provider or local or state health department says that you can return to your normal activities  Stay home except to get medical care    People who are mildly ill with COVID-19 are able to isolate at home during their illness  You should restrict activities outside your home, except for getting medical care  Do not go to work, school, or public areas  Avoid using public transportation, ride-sharing, or taxis  Separate yourself from other people and animals in your home    People: As much as possible, you should stay in a specific room and away from other people in your home  Also, you should use a separate bathroom, if available  Animals: You should restrict contact with pets and other animals while you are sick with COVID-19, just like you would around other people  Although there have not been reports of pets or other animals becoming sick with COVID-19, it is still recommended that people sick with COVID-19 limit contact with animals until more information is known about the virus  When possible, have another member of your household care for your animals while you are sick  If you are sick with COVID-19, avoid contact with your pet, including petting, snuggling, being kissed or licked, and sharing food   If you must care for your pet or be around animals while you are sick, wash your hands before and after you interact with pets and wear a facemask  See COVID-19 and Animals for more information  Call ahead before visiting your doctor    If you have a medical appointment, call the healthcare provider and tell them that you have or may have COVID-19  This will help the healthcare providers office take steps to keep other people from getting infected or exposed  Wear a facemask    You should wear a facemask when you are around other people (e g , sharing a room or vehicle) or pets and before you enter a healthcare providers office  If you are not able to wear a facemask (for example, because it causes trouble breathing), then people who live with you should not stay in the same room with you, or they should wear a facemask if they enter your room  Cover your coughs and sneezes    Cover your mouth and nose with a tissue when you cough or sneeze  Throw used tissues in a lined trash can  Immediately wash your hands with soap and water for at least 20 seconds or, if soap and water are not available, clean your hands with an alcohol-based hand  that contains at least 60% alcohol  Clean your hands often    Wash your hands often with soap and water for at least 20 seconds, especially after blowing your nose, coughing, or sneezing; going to the bathroom; and before eating or preparing food  If soap and water are not readily available, use an alcohol-based hand  with at least 60% alcohol, covering all surfaces of your hands and rubbing them together until they feel dry  Soap and water are the best option if hands are visibly dirty  Avoid touching your eyes, nose, and mouth with unwashed hands  Avoid sharing personal household items    You should not share dishes, drinking glasses, cups, eating utensils, towels, or bedding with other people or pets in your home  After using these items, they should be washed thoroughly with soap and water      Clean all high-touch surfaces everyday    High touch surfaces include counters, tabletops, doorknobs, bathroom fixtures, toilets, phones, keyboards, tablets, and bedside tables  Also, clean any surfaces that may have blood, stool, or body fluids on them  Use a household cleaning spray or wipe, according to the label instructions  Labels contain instructions for safe and effective use of the cleaning product including precautions you should take when applying the product, such as wearing gloves and making sure you have good ventilation during use of the product  Monitor your symptoms    Seek prompt medical attention if your illness is worsening (e g , difficulty breathing)  Before seeking care, call your healthcare provider and tell them that you have, or are being evaluated for, COVID-19  Put on a facemask before you enter the facility  These steps will help the healthcare providers office to keep other people in the office or waiting room from getting infected or exposed  Ask your healthcare provider to call the local or [facility] health department  Persons who are placed under active monitoring or facilitated self-monitoring should follow instructions provided by their local health department or occupational health professionals, as appropriate  If you have a medical emergency and need to call 911, notify the dispatch personnel that you have, or are being evaluated for COVID-19  If possible, put on a facemask before emergency medical services arrive      Discontinuing home isolation    Patients with confirmed COVID-19 should remain under home isolation precautions until the following conditions are met:   - They have had no fever for at least 24 hours (that is one full day of no fever without the use medicine that reduces fevers)  AND  - other symptoms have improved (for example, when their cough or shortness of breath have improved)  AND  - If had mild or moderate illness, at least 10 days have passed since their symptoms first appeared or if severe illness (needed oxygen) or immunosuppressed, at least 20 days have passed since symptoms first appeared  Patients with confirmed COVID-19 should also notify close contacts (including their workplace) and ask that they self-quarantine  Currently, close contact is defined as being within 6 feet for 15 minutes or more from the period 24 hours starting 48 hours before symptom onset to the time at which the patient went into isolation  Close contacts of patients diagnosed with COVID-19 should be instructed by the patient to self-quarantine for 14 days from the last time of their last contact with the patient       Source: [employer]

## 2022-02-02 NOTE — LETTER
February 2, 2022     Patient: Scarlett Aranda   YOB: 1965   Date of Visit: 2/2/2022       To Whom It May Concern: It is my medical opinion that Nita Jacinto may return if COVID test is negative and patient has been fever free for 24 hours without the use of a fever reducing agent  If COVID test is positive, patient may return on 02/05/2022 and when fever free for 24 hours without the use of a fever reducing agent  Upon return, the patient must then adhere to strict masking for an additional 5 days  If you have any questions or concerns, please don't hesitate to call           Sincerely,        Kaushik Telles PA-C    CC: No Recipients

## 2022-02-03 LAB — SARS-COV-2 RNA RESP QL NAA+PROBE: NEGATIVE

## 2022-04-22 DIAGNOSIS — E11.9 CONTROLLED TYPE 2 DIABETES MELLITUS WITHOUT COMPLICATION, WITHOUT LONG-TERM CURRENT USE OF INSULIN (HCC): ICD-10-CM

## 2022-04-22 DIAGNOSIS — R73.09 ABNORMAL BLOOD SUGAR: ICD-10-CM

## 2022-04-22 RX ORDER — SIMVASTATIN 40 MG
40 TABLET ORAL
Qty: 90 TABLET | Refills: 0 | Status: SHIPPED | OUTPATIENT
Start: 2022-04-22 | End: 2022-05-13 | Stop reason: SDUPTHER

## 2022-04-22 NOTE — TELEPHONE ENCOUNTER
04/22/22    Called pt and no answer  Left a message  If pt contacts office, please assist pt with scheduling appt         Appt: f/u DM (Needs Med Refill)

## 2022-04-25 NOTE — TELEPHONE ENCOUNTER
04/25/22     Called pt again and no answer  Left another message  If pt contacts office, please assist pt with scheduling appt           Appt: f/u DM (Needs Med Refill)

## 2022-05-13 ENCOUNTER — OFFICE VISIT (OUTPATIENT)
Dept: FAMILY MEDICINE CLINIC | Facility: CLINIC | Age: 57
End: 2022-05-13

## 2022-05-13 VITALS
HEIGHT: 68 IN | TEMPERATURE: 97.1 F | RESPIRATION RATE: 16 BRPM | HEART RATE: 83 BPM | BODY MASS INDEX: 22.67 KG/M2 | WEIGHT: 149.6 LBS | DIASTOLIC BLOOD PRESSURE: 78 MMHG | SYSTOLIC BLOOD PRESSURE: 124 MMHG | OXYGEN SATURATION: 98 %

## 2022-05-13 DIAGNOSIS — R41.3 MEMORY CHANGE: ICD-10-CM

## 2022-05-13 DIAGNOSIS — R73.09 ABNORMAL BLOOD SUGAR: ICD-10-CM

## 2022-05-13 DIAGNOSIS — B37.42 CANDIDAL BALANITIS: ICD-10-CM

## 2022-05-13 DIAGNOSIS — R30.0 BURNING WITH URINATION: ICD-10-CM

## 2022-05-13 DIAGNOSIS — Z12.11 SCREENING FOR COLORECTAL CANCER: ICD-10-CM

## 2022-05-13 DIAGNOSIS — Z12.12 SCREENING FOR COLORECTAL CANCER: ICD-10-CM

## 2022-05-13 DIAGNOSIS — Z11.4 SCREENING FOR HIV (HUMAN IMMUNODEFICIENCY VIRUS): ICD-10-CM

## 2022-05-13 DIAGNOSIS — E11.65 UNCONTROLLED TYPE 2 DIABETES MELLITUS WITH HYPERGLYCEMIA (HCC): Primary | ICD-10-CM

## 2022-05-13 DIAGNOSIS — E11.9 CONTROLLED TYPE 2 DIABETES MELLITUS WITHOUT COMPLICATION, WITHOUT LONG-TERM CURRENT USE OF INSULIN (HCC): ICD-10-CM

## 2022-05-13 DIAGNOSIS — Z00.00 ANNUAL PHYSICAL EXAM: ICD-10-CM

## 2022-05-13 LAB
CREAT UR-MCNC: 80.5 MG/DL
MICROALBUMIN UR-MCNC: 7 MG/L (ref 0–20)
MICROALBUMIN/CREAT 24H UR: 9 MG/G CREATININE (ref 0–30)
SL AMB  POCT GLUCOSE, UA: 250
SL AMB LEUKOCYTE ESTERASE,UA: NEGATIVE
SL AMB POCT BILIRUBIN,UA: NEGATIVE
SL AMB POCT BLOOD,UA: NEGATIVE
SL AMB POCT CLARITY,UA: ABNORMAL
SL AMB POCT COLOR,UA: YELLOW
SL AMB POCT HEMOGLOBIN AIC: 8.4 (ref ?–6.5)
SL AMB POCT KETONES,UA: NEGATIVE
SL AMB POCT NITRITE,UA: NEGATIVE
SL AMB POCT PH,UA: 5
SL AMB POCT SPECIFIC GRAVITY,UA: 1.01
SL AMB POCT URINE PROTEIN: ABNORMAL
SL AMB POCT UROBILINOGEN: NORMAL

## 2022-05-13 PROCEDURE — 3078F DIAST BP <80 MM HG: CPT | Performed by: FAMILY MEDICINE

## 2022-05-13 PROCEDURE — 3074F SYST BP LT 130 MM HG: CPT | Performed by: FAMILY MEDICINE

## 2022-05-13 PROCEDURE — 3008F BODY MASS INDEX DOCD: CPT | Performed by: FAMILY MEDICINE

## 2022-05-13 PROCEDURE — 3061F NEG MICROALBUMINURIA REV: CPT | Performed by: FAMILY MEDICINE

## 2022-05-13 PROCEDURE — 82570 ASSAY OF URINE CREATININE: CPT | Performed by: FAMILY MEDICINE

## 2022-05-13 PROCEDURE — 3725F SCREEN DEPRESSION PERFORMED: CPT | Performed by: FAMILY MEDICINE

## 2022-05-13 PROCEDURE — 82043 UR ALBUMIN QUANTITATIVE: CPT | Performed by: FAMILY MEDICINE

## 2022-05-13 PROCEDURE — 3052F HG A1C>EQUAL 8.0%<EQUAL 9.0%: CPT | Performed by: FAMILY MEDICINE

## 2022-05-13 PROCEDURE — 99396 PREV VISIT EST AGE 40-64: CPT | Performed by: FAMILY MEDICINE

## 2022-05-13 PROCEDURE — 81002 URINALYSIS NONAUTO W/O SCOPE: CPT | Performed by: FAMILY MEDICINE

## 2022-05-13 PROCEDURE — 1036F TOBACCO NON-USER: CPT | Performed by: FAMILY MEDICINE

## 2022-05-13 PROCEDURE — 83036 HEMOGLOBIN GLYCOSYLATED A1C: CPT | Performed by: FAMILY MEDICINE

## 2022-05-13 RX ORDER — AMMONIUM LACTATE 12 G/100G
CREAM TOPICAL AS NEEDED
Qty: 385 G | Refills: 1 | Status: SHIPPED | OUTPATIENT
Start: 2022-05-13

## 2022-05-13 RX ORDER — CLOTRIMAZOLE 1 %
CREAM (GRAM) TOPICAL 2 TIMES DAILY
Qty: 30 G | Refills: 0 | Status: SHIPPED | OUTPATIENT
Start: 2022-05-13

## 2022-05-13 RX ORDER — SIMVASTATIN 40 MG
40 TABLET ORAL
Qty: 90 TABLET | Refills: 3 | Status: SHIPPED | OUTPATIENT
Start: 2022-05-13

## 2022-05-13 NOTE — PATIENT INSTRUCTIONS

## 2022-05-13 NOTE — ASSESSMENT & PLAN NOTE
Lab Results   Component Value Date    HGBA1C 8 4 (A) 05/13/2022   Slightly increased from prior  Given patient patient's intolerance to Metformin, discontinue Metformin   Start [facility]  Reviewed low carb diet
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
## 2022-05-14 ENCOUNTER — APPOINTMENT (OUTPATIENT)
Dept: LAB | Age: 57
End: 2022-05-14
Payer: COMMERCIAL

## 2022-05-14 DIAGNOSIS — Z11.4 SCREENING FOR HIV (HUMAN IMMUNODEFICIENCY VIRUS): ICD-10-CM

## 2022-05-14 DIAGNOSIS — R41.3 MEMORY CHANGE: ICD-10-CM

## 2022-05-14 DIAGNOSIS — E11.65 UNCONTROLLED TYPE 2 DIABETES MELLITUS WITH HYPERGLYCEMIA (HCC): ICD-10-CM

## 2022-05-14 LAB
ALBUMIN SERPL BCP-MCNC: 3.6 G/DL (ref 3.5–5)
ALP SERPL-CCNC: 71 U/L (ref 46–116)
ALT SERPL W P-5'-P-CCNC: 29 U/L (ref 12–78)
ANION GAP SERPL CALCULATED.3IONS-SCNC: 3 MMOL/L (ref 4–13)
AST SERPL W P-5'-P-CCNC: 20 U/L (ref 5–45)
BASOPHILS # BLD AUTO: 0.02 THOUSANDS/ΜL (ref 0–0.1)
BASOPHILS NFR BLD AUTO: 1 % (ref 0–1)
BILIRUB SERPL-MCNC: 0.55 MG/DL (ref 0.2–1)
BUN SERPL-MCNC: 15 MG/DL (ref 5–25)
CALCIUM SERPL-MCNC: 8.8 MG/DL (ref 8.3–10.1)
CHLORIDE SERPL-SCNC: 106 MMOL/L (ref 100–108)
CHOLEST SERPL-MCNC: 121 MG/DL
CO2 SERPL-SCNC: 30 MMOL/L (ref 21–32)
CREAT SERPL-MCNC: 0.83 MG/DL (ref 0.6–1.3)
EOSINOPHIL # BLD AUTO: 0.17 THOUSAND/ΜL (ref 0–0.61)
EOSINOPHIL NFR BLD AUTO: 4 % (ref 0–6)
ERYTHROCYTE [DISTWIDTH] IN BLOOD BY AUTOMATED COUNT: 13.4 % (ref 11.6–15.1)
GFR SERPL CREATININE-BSD FRML MDRD: 98 ML/MIN/1.73SQ M
GLUCOSE P FAST SERPL-MCNC: 151 MG/DL (ref 65–99)
HCT VFR BLD AUTO: 41.7 % (ref 36.5–49.3)
HDLC SERPL-MCNC: 37 MG/DL
HGB BLD-MCNC: 13.9 G/DL (ref 12–17)
IMM GRANULOCYTES # BLD AUTO: 0.01 THOUSAND/UL (ref 0–0.2)
IMM GRANULOCYTES NFR BLD AUTO: 0 % (ref 0–2)
LDLC SERPL CALC-MCNC: 66 MG/DL (ref 0–100)
LYMPHOCYTES # BLD AUTO: 1.07 THOUSANDS/ΜL (ref 0.6–4.47)
LYMPHOCYTES NFR BLD AUTO: 27 % (ref 14–44)
MCH RBC QN AUTO: 28.8 PG (ref 26.8–34.3)
MCHC RBC AUTO-ENTMCNC: 33.3 G/DL (ref 31.4–37.4)
MCV RBC AUTO: 86 FL (ref 82–98)
MONOCYTES # BLD AUTO: 0.45 THOUSAND/ΜL (ref 0.17–1.22)
MONOCYTES NFR BLD AUTO: 12 % (ref 4–12)
NEUTROPHILS # BLD AUTO: 2.2 THOUSANDS/ΜL (ref 1.85–7.62)
NEUTS SEG NFR BLD AUTO: 56 % (ref 43–75)
NONHDLC SERPL-MCNC: 84 MG/DL
NRBC BLD AUTO-RTO: 0 /100 WBCS
PLATELET # BLD AUTO: 264 THOUSANDS/UL (ref 149–390)
PMV BLD AUTO: 10.7 FL (ref 8.9–12.7)
POTASSIUM SERPL-SCNC: 4.4 MMOL/L (ref 3.5–5.3)
PROT SERPL-MCNC: 7.2 G/DL (ref 6.4–8.2)
RBC # BLD AUTO: 4.83 MILLION/UL (ref 3.88–5.62)
SODIUM SERPL-SCNC: 139 MMOL/L (ref 136–145)
TRIGL SERPL-MCNC: 90 MG/DL
TSH SERPL DL<=0.05 MIU/L-ACNC: 0.95 UIU/ML (ref 0.45–4.5)
VIT B12 SERPL-MCNC: 505 PG/ML (ref 100–900)
WBC # BLD AUTO: 3.92 THOUSAND/UL (ref 4.31–10.16)

## 2022-05-14 PROCEDURE — 82607 VITAMIN B-12: CPT

## 2022-05-14 PROCEDURE — 86617 LYME DISEASE ANTIBODY: CPT

## 2022-05-14 PROCEDURE — 85025 COMPLETE CBC W/AUTO DIFF WBC: CPT

## 2022-05-14 PROCEDURE — 86618 LYME DISEASE ANTIBODY: CPT

## 2022-05-14 PROCEDURE — 80061 LIPID PANEL: CPT

## 2022-05-14 PROCEDURE — 87389 HIV-1 AG W/HIV-1&-2 AB AG IA: CPT

## 2022-05-14 PROCEDURE — 84443 ASSAY THYROID STIM HORMONE: CPT

## 2022-05-14 PROCEDURE — 36415 COLL VENOUS BLD VENIPUNCTURE: CPT

## 2022-05-14 PROCEDURE — 80053 COMPREHEN METABOLIC PANEL: CPT

## 2022-05-15 LAB — HIV 1+2 AB+HIV1 P24 AG SERPL QL IA: NORMAL

## 2022-05-17 LAB — B BURGDOR IGG+IGM SER-ACNC: 197

## 2022-05-19 LAB
B BURGDOR IGG PATRN SER IB-IMP: NEGATIVE
B BURGDOR IGM PATRN SER IB-IMP: NEGATIVE
B BURGDOR18KD IGG SER QL IB: ABNORMAL
B BURGDOR23KD IGG SER QL IB: ABNORMAL
B BURGDOR23KD IGM SER QL IB: PRESENT
B BURGDOR28KD IGG SER QL IB: PRESENT
B BURGDOR30KD IGG SER QL IB: ABNORMAL
B BURGDOR39KD IGG SER QL IB: PRESENT
B BURGDOR39KD IGM SER QL IB: ABNORMAL
B BURGDOR41KD IGG SER QL IB: ABNORMAL
B BURGDOR41KD IGM SER QL IB: ABNORMAL
B BURGDOR45KD IGG SER QL IB: ABNORMAL
B BURGDOR58KD IGG SER QL IB: PRESENT
B BURGDOR66KD IGG SER QL IB: ABNORMAL
B BURGDOR93KD IGG SER QL IB: PRESENT

## 2022-05-20 ENCOUNTER — TELEPHONE (OUTPATIENT)
Dept: FAMILY MEDICINE CLINIC | Facility: CLINIC | Age: 57
End: 2022-05-20

## 2022-06-07 ENCOUNTER — TELEPHONE (OUTPATIENT)
Dept: FAMILY MEDICINE CLINIC | Facility: CLINIC | Age: 57
End: 2022-06-07

## 2022-06-07 NOTE — TELEPHONE ENCOUNTER
He told me he was having trouble with the metformin, that it was causing bloating and GI issues, so I told him to keep taking it till he got the [place] [facility] and could than discontinue Metformin and start taking [place] [facility]    If he already got the [place] [facility], he can STOP metformin and start [place] [facility] 1 tablet once a day

## 2022-06-07 NOTE — TELEPHONE ENCOUNTER
Pt wants to know if he is supposed to be taking both the farxiga & metformin, or just the farxiga  He is also asking for clarification on when & how much to take      He states he is currently taking 2 in the AM & 2 in the PM

## 2022-06-27 LAB
LEFT EYE DIABETIC RETINOPATHY: ABNORMAL
LEFT EYE IMAGE QUALITY: ABNORMAL
LEFT EYE MACULAR EDEMA: ABNORMAL
LEFT EYE OTHER RETINOPATHY: ABNORMAL
RIGHT EYE DIABETIC RETINOPATHY: ABNORMAL
RIGHT EYE IMAGE QUALITY: ABNORMAL
RIGHT EYE MACULAR EDEMA: ABNORMAL
RIGHT EYE OTHER RETINOPATHY: ABNORMAL
SEVERITY (EYE EXAM): ABNORMAL

## 2022-06-27 PROCEDURE — 2024F 7 FLD RTA PHOTO EVC RTNOPTHY: CPT | Performed by: FAMILY MEDICINE

## 2022-06-28 DIAGNOSIS — E11.3299 MILD NONPROLIFERATIVE DIABETIC RETINOPATHY ASSOCIATED WITH TYPE 2 DIABETES MELLITUS, MACULAR EDEMA PRESENCE UNSPECIFIED, UNSPECIFIED LATERALITY (HCC): Primary | ICD-10-CM

## 2022-08-16 ENCOUNTER — OFFICE VISIT (OUTPATIENT)
Dept: URGENT CARE | Age: 57
End: 2022-08-16
Payer: COMMERCIAL

## 2022-08-16 VITALS
OXYGEN SATURATION: 99 % | SYSTOLIC BLOOD PRESSURE: 122 MMHG | TEMPERATURE: 97.9 F | HEART RATE: 66 BPM | DIASTOLIC BLOOD PRESSURE: 82 MMHG | RESPIRATION RATE: 18 BRPM
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
## 2022-08-16 DIAGNOSIS — S80.862A INSECT BITE OF LEFT LOWER LEG, INITIAL ENCOUNTER: Primary | ICD-10-CM

## 2022-08-16 PROCEDURE — 99213 OFFICE O/P EST LOW 20 MIN: CPT

## 2022-08-16 RX ORDER — TRIAMCINOLONE ACETONIDE 5 MG/G
CREAM TOPICAL 3 TIMES DAILY
Qty: 30 G | Refills: 0 | Status: SHIPPED | OUTPATIENT
Start: 2022-08-16
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
## 2022-12-06 DIAGNOSIS — E11.65 UNCONTROLLED TYPE 2 DIABETES MELLITUS WITH HYPERGLYCEMIA (HCC): ICD-10-CM

## 2022-12-07 RX ORDER — DAPAGLIFLOZIN 10 MG/1
TABLET, FILM COATED ORAL
Qty: 90 TABLET | Refills: 0 | Status: SHIPPED | OUTPATIENT
Start: 2022-12-07

## 2023-03-08 DIAGNOSIS — E11.65 UNCONTROLLED TYPE 2 DIABETES MELLITUS WITH HYPERGLYCEMIA (HCC): ICD-10-CM

## 2023-03-09 RX ORDER — DAPAGLIFLOZIN 10 MG/1
TABLET, FILM COATED ORAL
Qty: 90 TABLET | Refills: 0 | Status: SHIPPED | OUTPATIENT
Start: 2023-03-09 | End: 2023-03-17 | Stop reason: SDUPTHER

## 2023-03-17 ENCOUNTER — OFFICE VISIT (OUTPATIENT)
Dept: FAMILY MEDICINE CLINIC | Facility: CLINIC | Age: 58
End: 2023-03-17

## 2023-03-17 ENCOUNTER — APPOINTMENT (OUTPATIENT)
Dept: LAB | Facility: CLINIC | Age: 58
End: 2023-03-17

## 2023-03-17 VITALS
DIASTOLIC BLOOD PRESSURE: 80 MMHG | OXYGEN SATURATION: 99 % | HEIGHT: 68 IN | BODY MASS INDEX: 21.67 KG/M2 | WEIGHT: 143 LBS | SYSTOLIC BLOOD PRESSURE: 126 MMHG | TEMPERATURE: 97.1 F | RESPIRATION RATE: 17 BRPM | HEART RATE: 71 BPM

## 2023-03-17 DIAGNOSIS — E55.9 VITAMIN D DEFICIENCY: ICD-10-CM

## 2023-03-17 DIAGNOSIS — Z12.5 SCREENING FOR MALIGNANT NEOPLASM OF PROSTATE: ICD-10-CM

## 2023-03-17 DIAGNOSIS — E11.65 UNCONTROLLED TYPE 2 DIABETES MELLITUS WITH HYPERGLYCEMIA (HCC): ICD-10-CM

## 2023-03-17 DIAGNOSIS — E11.9 TYPE 2 DIABETES MELLITUS WITHOUT COMPLICATION, WITHOUT LONG-TERM CURRENT USE OF INSULIN (HCC): ICD-10-CM

## 2023-03-17 DIAGNOSIS — E11.9 ENCOUNTER FOR DIABETIC FOOT EXAM (HCC): ICD-10-CM

## 2023-03-17 DIAGNOSIS — E55.9 VITAMIN D DEFICIENCY: Primary | ICD-10-CM

## 2023-03-17 DIAGNOSIS — R19.8 RECTAL FULLNESS: ICD-10-CM

## 2023-03-17 LAB
25(OH)D3 SERPL-MCNC: 31.2 NG/ML (ref 30–100)
ALBUMIN SERPL BCP-MCNC: 3.9 G/DL (ref 3.5–5)
ALP SERPL-CCNC: 69 U/L (ref 46–116)
ALT SERPL W P-5'-P-CCNC: 34 U/L (ref 12–78)
ANION GAP SERPL CALCULATED.3IONS-SCNC: 1 MMOL/L (ref 4–13)
AST SERPL W P-5'-P-CCNC: 24 U/L (ref 5–45)
BASOPHILS # BLD AUTO: 0.03 THOUSANDS/ÂΜL (ref 0–0.1)
BASOPHILS NFR BLD AUTO: 1 % (ref 0–1)
BILIRUB SERPL-MCNC: 0.53 MG/DL (ref 0.2–1)
BUN SERPL-MCNC: 12 MG/DL (ref 5–25)
CALCIUM SERPL-MCNC: 9 MG/DL (ref 8.3–10.1)
CHLORIDE SERPL-SCNC: 107 MMOL/L (ref 96–108)
CO2 SERPL-SCNC: 30 MMOL/L (ref 21–32)
CREAT SERPL-MCNC: 0.78 MG/DL (ref 0.6–1.3)
CREAT UR-MCNC: 135 MG/DL
EOSINOPHIL # BLD AUTO: 0.18 THOUSAND/ÂΜL (ref 0–0.61)
EOSINOPHIL NFR BLD AUTO: 4 % (ref 0–6)
ERYTHROCYTE [DISTWIDTH] IN BLOOD BY AUTOMATED COUNT: 13.6 % (ref 11.6–15.1)
GFR SERPL CREATININE-BSD FRML MDRD: 100 ML/MIN/1.73SQ M
GLUCOSE P FAST SERPL-MCNC: 137 MG/DL (ref 65–99)
HCT VFR BLD AUTO: 47.7 % (ref 36.5–49.3)
HGB BLD-MCNC: 15.6 G/DL (ref 12–17)
IMM GRANULOCYTES # BLD AUTO: 0.01 THOUSAND/UL (ref 0–0.2)
IMM GRANULOCYTES NFR BLD AUTO: 0 % (ref 0–2)
LYMPHOCYTES # BLD AUTO: 1.27 THOUSANDS/ÂΜL (ref 0.6–4.47)
LYMPHOCYTES NFR BLD AUTO: 31 % (ref 14–44)
MCH RBC QN AUTO: 29.2 PG (ref 26.8–34.3)
MCHC RBC AUTO-ENTMCNC: 32.7 G/DL (ref 31.4–37.4)
MCV RBC AUTO: 89 FL (ref 82–98)
MICROALBUMIN UR-MCNC: 8.4 MG/L (ref 0–20)
MICROALBUMIN/CREAT 24H UR: 6 MG/G CREATININE (ref 0–30)
MONOCYTES # BLD AUTO: 0.42 THOUSAND/ÂΜL (ref 0.17–1.22)
MONOCYTES NFR BLD AUTO: 10 % (ref 4–12)
NEUTROPHILS # BLD AUTO: 2.26 THOUSANDS/ÂΜL (ref 1.85–7.62)
NEUTS SEG NFR BLD AUTO: 54 % (ref 43–75)
NRBC BLD AUTO-RTO: 0 /100 WBCS
PLATELET # BLD AUTO: 259 THOUSANDS/UL (ref 149–390)
PMV BLD AUTO: 10.9 FL (ref 8.9–12.7)
POTASSIUM SERPL-SCNC: 4.4 MMOL/L (ref 3.5–5.3)
PROT SERPL-MCNC: 7.7 G/DL (ref 6.4–8.4)
RBC # BLD AUTO: 5.35 MILLION/UL (ref 3.88–5.62)
SL AMB POCT HEMOGLOBIN AIC: 7.2 (ref ?–6.5)
SODIUM SERPL-SCNC: 138 MMOL/L (ref 135–147)
TSH SERPL DL<=0.05 MIU/L-ACNC: 1.22 UIU/ML (ref 0.45–4.5)
WBC # BLD AUTO: 4.17 THOUSAND/UL (ref 4.31–10.16)

## 2023-03-17 NOTE — PROGRESS NOTES
Diabetic Foot Exam    Patient's shoes and socks removed  Right Foot/Ankle   Right Foot Inspection  Skin Exam: skin normal and skin intact  No dry skin, no warmth, no callus, no erythema, no maceration, no abnormal color, no pre-ulcer, no ulcer and no callus  Toe Exam: ROM and strength within normal limits  Sensory   Proprioception: intact  Monofilament testing: intact    Vascular  Capillary refills: < 3 seconds  The right DP pulse is 1+  The right PT pulse is 1+  Left Foot/Ankle  Left Foot Inspection      Toe Exam: ROM and strength within normal limits  Sensory   Proprioception: intact  Monofilament testing: intact    Vascular  Capillary refills: < 3 seconds  The left DP pulse is 1+  The left PT pulse is 1+  Assign Risk Category  No deformity present  No loss of protective sensation  No weak pulses  Risk: 0  Name: Yonis Tripathi      : 1965      MRN: [id number]  Encounter Provider: Bria Gan MD  Encounter Date: 3/17/2023   Encounter department: [address] [place] [address]     1  Vitamin D deficiency  -     Vitamin D 25 hydroxy; Future    2  Uncontrolled type 2 diabetes mellitus with hyperglycemia (HCC)  -     POCT hemoglobin A1c  -     dapagliflozin (Farxiga) 10 MG tablet; Take 1 tablet (10 mg total) by mouth every morning    3  Type 2 diabetes mellitus without complication, without long-term current use of insulin (HCC)  -     CBC and differential; Future  -     Comprehensive metabolic panel; Future  -     Microalbumin / creatinine urine ratio; Future  -     TSH, 3rd generation with Free T4 reflex; Future  -     Vitamin D 25 hydroxy; Future    4  Screening for malignant neoplasm of prostate  -     PSA, total and free; Future    5  [facility]  -     Diabetic foot exam; Future    6   Rectal fullness       Patient would like to differ rectal exam at this time  PSA ordered   Increase fiber and fluid intake Subjective        61 yo  male with DM here today for fu  No s/s of hypo or hyperglycemia  Last eye appointment 3 months ago  Checks feet daily  Follow low carb diet, avoid concentrated sugars and simple carbs  Does not exercise but is very physically active at work   Doing well except for feeling of pressure in his rectum, worse when sitting down and nocturia     Review of Systems   Genitourinary: Positive for urgency  All other systems reviewed and are negative  Current Outpatient Medications on File Prior to Visit   Medication Sig   • ammonium lactate (LAC-HYDRIN) 12 % cream Apply topically as needed for dry skin   • clotrimazole (LOTRIMIN) 1 % cream Apply topically 2 (two) times a day   • hydrocortisone (ANUSOL-HC) 2 5 % rectal cream Apply topically 2 (two) times a day (Patient not taking: Reported on 2/2/2022 )   • simvastatin (ZOCOR) 40 mg tablet Take 1 tablet (40 mg total) by mouth daily at bedtime   • triamcinolone (KENALOG) 0 5 % cream Apply topically 3 (three) times a day   • [DISCONTINUED] Farxiga 10 MG tablet TAKE 1 TABLET BY MOUTH IN THE MORNING   • [DISCONTINUED] meloxicam (MOBIC) 15 mg tablet Take 1 tablet (15 mg total) by mouth daily (Patient not taking: Reported on 2/2/2022 )   • [DISCONTINUED] nystatin-triamcinolone (MYCOLOG-II) ointment Apply topically 2 (two) times a day For 2 weeks (Patient not taking: Reported on 2/2/2022 )       Objective     /80 (BP Location: Left arm, Patient Position: Sitting, Cuff Size: Standard)   Pulse 71   Temp (!) 97 1 °F (36 2 °C) (Temporal)   Resp 17   Ht 5' 8" (1 727 m)   Wt 64 9 kg (143 lb)   SpO2 99%   BMI 21 74 kg/m²     Physical Exam  Vitals and nursing note reviewed  Constitutional:       Appearance: He is well-developed  HENT:      Head: Normocephalic        Right Ear: External ear normal       Left Ear: External ear normal       Nose: Nose normal    Eyes:      Conjunctiva/sclera: Conjunctivae normal       Pupils: Pupils are equal, round, and reactive to light  Neck:      Thyroid: No thyromegaly  Cardiovascular:      Rate and Rhythm: Normal rate and regular rhythm  Pulses: no weak pulses          Dorsalis pedis pulses are 1+ on the right side and 1+ on the left side  Posterior tibial pulses are 1+ on the right side and 1+ on the left side  Heart sounds: Normal heart sounds  Pulmonary:      Effort: Pulmonary effort is normal       Breath sounds: Normal breath sounds  Abdominal:      Palpations: Abdomen is soft  Tenderness: There is no abdominal tenderness  There is no guarding or rebound  Musculoskeletal:         General: Normal range of motion  Cervical back: Normal range of motion and neck supple  Feet:      Right foot:      Skin integrity: No ulcer, skin breakdown, erythema, warmth, callus or dry skin  Skin:     General: Skin is dry  Neurological:      Mental Status: He is alert and oriented to person, place, and time  Deep Tendon Reflexes: Reflexes are normal and symmetric         Fernanda Marin MD

## 2023-03-21 LAB
PSA FREE MFR SERPL: 15.2 %
PSA FREE SERPL-MCNC: 0.64 NG/ML
PSA SERPL-MCNC: 4.2 NG/ML

## 2023-03-22 DIAGNOSIS — R97.20 ELEVATED PSA: ICD-10-CM

## 2023-03-22 DIAGNOSIS — R19.8 RECTAL FULLNESS: Primary | ICD-10-CM

## 2023-04-07 ENCOUNTER — TELEPHONE (OUTPATIENT)
Dept: UROLOGY | Facility: AMBULATORY SURGERY CENTER | Age: 58
End: 2023-04-07

## 2023-04-07 NOTE — TELEPHONE ENCOUNTER
Please Triage  New Patient    What is the reason for the patient’s appointment? Rectal fullness, Elevated PSA    What office location does the patient prefer? [facility]     Imaging/Lab Results:    Do we accept the patient's insurance or is the patient Self-Pay? Yes     Insurance Provider: Richard Damian  Member ID#:  [id number]    Has the patient had any previous Urologist(s)? 2018 Dr Fely Lowe 2018    Have patient records been requested? If not are records showing in [address] [place] [address] Rd: records in [facility]     Has the patient had any outside testing done? No     Does the patient have a personal history of cancer?  No

## 2023-05-19 ENCOUNTER — OFFICE VISIT (OUTPATIENT)
Dept: UROLOGY | Facility: CLINIC | Age: 58
End: 2023-05-19

## 2023-05-19 VITALS
BODY MASS INDEX: 21.82 KG/M2 | HEIGHT: 68 IN | DIASTOLIC BLOOD PRESSURE: 70 MMHG | SYSTOLIC BLOOD PRESSURE: 120 MMHG | WEIGHT: 144 LBS | HEART RATE: 68 BPM

## 2023-05-19 DIAGNOSIS — R35.1 BENIGN PROSTATIC HYPERPLASIA WITH NOCTURIA: ICD-10-CM

## 2023-05-19 DIAGNOSIS — N20.0 CALCULUS OF KIDNEY: ICD-10-CM

## 2023-05-19 DIAGNOSIS — R97.20 ELEVATED PSA: Primary | ICD-10-CM

## 2023-05-19 DIAGNOSIS — N40.1 BENIGN PROSTATIC HYPERPLASIA WITH NOCTURIA: ICD-10-CM

## 2023-05-19 DIAGNOSIS — R19.8 RECTAL FULLNESS: ICD-10-CM

## 2023-05-19 LAB
SL AMB  POCT GLUCOSE, UA: 2000
SL AMB LEUKOCYTE ESTERASE,UA: ABNORMAL
SL AMB POCT BILIRUBIN,UA: ABNORMAL
SL AMB POCT BLOOD,UA: ABNORMAL
SL AMB POCT CLARITY,UA: CLEAR
SL AMB POCT COLOR,UA: YELLOW
SL AMB POCT KETONES,UA: ABNORMAL
SL AMB POCT NITRITE,UA: ABNORMAL
SL AMB POCT PH,UA: 6.5
SL AMB POCT SPECIFIC GRAVITY,UA: 1
SL AMB POCT URINE PROTEIN: ABNORMAL
SL AMB POCT UROBILINOGEN: 0.2

## 2023-05-19 RX ORDER — TAMSULOSIN HYDROCHLORIDE 0.4 MG/1
0.4 CAPSULE ORAL
Qty: 90 CAPSULE | Refills: 3 | Status: SHIPPED | OUTPATIENT
Start: 2023-05-19 | End: 2023-08-17
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
## 2023-06-16 ENCOUNTER — HOSPITAL ENCOUNTER (OUTPATIENT)
Dept: ULTRASOUND IMAGING | Facility: MEDICAL CENTER | Age: 58
Discharge: HOME | End: 2023-06-16
Payer: COMMERCIAL

## 2023-06-16 DIAGNOSIS — N20.0 CALCULUS OF KIDNEY: ICD-10-CM

## 2023-06-16 PROCEDURE — 76770 US EXAM ABDO BACK WALL COMP: CPT

## 2023-07-16 DIAGNOSIS — E11.65 UNCONTROLLED TYPE 2 DIABETES MELLITUS WITH HYPERGLYCEMIA (HCC): ICD-10-CM

## 2023-07-17 RX ORDER — SIMVASTATIN 40 MG
TABLET ORAL
Qty: 90 TABLET | Refills: 0 | Status: SHIPPED | OUTPATIENT
Start: 2023-07-17

## 2023-07-31 DIAGNOSIS — E11.65 UNCONTROLLED TYPE 2 DIABETES MELLITUS WITH HYPERGLYCEMIA (HCC): ICD-10-CM

## 2023-08-10 ENCOUNTER — TELEPHONE (OUTPATIENT)
Dept: FAMILY MEDICINE CLINIC | Facility: CLINIC | Age: 58
End: 2023-08-10

## 2023-08-10 NOTE — TELEPHONE ENCOUNTER
1st attempt 8/10 at 2:37    Left message to call office to schedule appointment. Patient was last seen in March.

## 2023-08-14 NOTE — TELEPHONE ENCOUNTER
2nd attempt at calling patient 8/14 at 1:12 left message on answering machine to contact office to schedule a follow up DM since his last appointment was in march. Thanks.

## 2023-08-15 NOTE — TELEPHONE ENCOUNTER
3rd attempt 8/15 at 12:29 left message to call office back to schedule appointment, last time patient was in office was in march. Letter sent.

## 2023-08-16 ENCOUNTER — TELEPHONE (OUTPATIENT)
Dept: FAMILY MEDICINE CLINIC | Facility: CLINIC | Age: 58
End: 2023-08-16

## 2023-08-16 NOTE — TELEPHONE ENCOUNTER
PCP SIGNATURE NEEDED FOR ADVOCATE RX FORM RECEIVED VIA FAX AND PLACED IN PCP FOLDER TO BE DELIVERED AT ASSIGNED TIMES.

## 2023-09-08 ENCOUNTER — OFFICE VISIT (OUTPATIENT)
Dept: FAMILY MEDICINE CLINIC | Facility: CLINIC | Age: 58
End: 2023-09-08

## 2023-09-08 VITALS
SYSTOLIC BLOOD PRESSURE: 112 MMHG | BODY MASS INDEX: 21.95 KG/M2 | RESPIRATION RATE: 22 BRPM | HEIGHT: 68 IN | WEIGHT: 144.8 LBS | HEART RATE: 85 BPM | DIASTOLIC BLOOD PRESSURE: 68 MMHG | TEMPERATURE: 97.8 F

## 2023-09-08 DIAGNOSIS — K64.4 EXTERNAL HEMORRHOIDS: ICD-10-CM

## 2023-09-08 DIAGNOSIS — E11.9 DIABETIC EYE EXAM (HCC): ICD-10-CM

## 2023-09-08 DIAGNOSIS — E78.5 HYPERLIPIDEMIA, UNSPECIFIED HYPERLIPIDEMIA TYPE: ICD-10-CM

## 2023-09-08 DIAGNOSIS — Z01.00 DIABETIC EYE EXAM (HCC): ICD-10-CM

## 2023-09-08 DIAGNOSIS — E11.65 UNCONTROLLED TYPE 2 DIABETES MELLITUS WITH HYPERGLYCEMIA (HCC): Primary | ICD-10-CM

## 2023-09-08 LAB — SL AMB POCT HEMOGLOBIN AIC: 7.7 (ref ?–6.5)

## 2023-09-08 PROCEDURE — 83036 HEMOGLOBIN GLYCOSYLATED A1C: CPT | Performed by: FAMILY MEDICINE

## 2023-09-08 PROCEDURE — 99215 OFFICE O/P EST HI 40 MIN: CPT | Performed by: FAMILY MEDICINE

## 2023-09-08 RX ORDER — SIMVASTATIN 40 MG
40 TABLET ORAL
Qty: 90 TABLET | Refills: 2 | Status: SHIPPED | OUTPATIENT
Start: 2023-09-08

## 2023-09-08 RX ORDER — HYDROCORTISONE 25 MG/G
CREAM TOPICAL 2 TIMES DAILY
Qty: 28 G | Refills: 1 | Status: SHIPPED | OUTPATIENT
Start: 2023-09-08

## 2023-09-08 NOTE — PROGRESS NOTES
Name: Malou Miles      : 1965      MRN: [id number]  Encounter Provider: Diane Bales MD  Encounter Date: 2023   Encounter department: [address] [facility]     1. Uncontrolled type 2 diabetes mellitus with hyperglycemia (HCC)  Assessment & Plan:    Lab Results   Component Value Date    HGBA1C 7.7 (A) 2023   Slight increase compared to prior  Counseled patient on decreasing carbohydrates in diet. Counseled on what are carbohydrates, and the difference between simple and complex carbohydrates  Counseled on portions of carbohydrates  Counseled on reading food labels  Increase lean protein     Orders:  -     POCT hemoglobin A1c  -     simvastatin (ZOCOR) 40 mg tablet; Take 1 tablet (40 mg total) by mouth daily at bedtime  -     CBC and differential; Future  -     Comprehensive metabolic panel; Future  -     Lipid panel; Future  -     Albumin / creatinine urine ratio; Future  -     TSH, 3rd generation with Free T4 reflex; Future  -     Ambulatory Referral to Ophthalmology; Future    2. External hemorrhoids  -     hydrocortisone (ANUSOL-HC) 2.5 % rectal cream; Apply topically 2 (two) times a day    3. Hyperlipidemia, unspecified hyperlipidemia type  -     CBC and differential; Future  -     Comprehensive metabolic panel; Future  -     Lipid panel; Future  -     Albumin / creatinine urine ratio; Future  -     TSH, 3rd generation with Free T4 reflex; Future    4. Diabetic eye exam [facility])  -     Ambulatory Referral to Ophthalmology; Future         Over 50% of the encounter was spent counseling patient face to face on all the above   Subjective      61 yo  male with DM  States has been working longer hours so has been eating more carbs: has plantains in the morning and rice with lunch and dinner. Denies s/s of hypo or hyperglycemia  Checks feet   Due for eye exam      Review of Systems   All other systems reviewed and are negative.       Current Outpatient Medications on File Prior to Visit   Medication Sig   • ammonium lactate (LAC-HYDRIN) 12 % cream Apply topically as needed for dry skin   • clotrimazole (LOTRIMIN) 1 % cream Apply topically 2 (two) times a day   • dapagliflozin (Farxiga) 10 MG tablet Take 1 tablet (10 mg total) by mouth every morning   • tamsulosin (Flomax) 0.4 mg Take 1 capsule (0.4 mg total) by mouth daily with dinner for 90 doses   • triamcinolone (KENALOG) 0.5 % cream Apply topically 3 (three) times a day   • [DISCONTINUED] hydrocortisone (ANUSOL-HC) 2.5 % rectal cream Apply topically 2 (two) times a day   • [DISCONTINUED] simvastatin (ZOCOR) 40 mg tablet TAKE 1 TABLET BY MOUTH ONCE DAILY AT BEDTIME       Objective     /68 (BP Location: Left arm, Patient Position: Sitting, Cuff Size: Standard)   Pulse 85   Temp 97.8 °F (36.6 °C) (Temporal)   Resp 22   Ht 5' 8" (1.727 m)   Wt 65.7 kg (144 lb 12.8 oz)   BMI 22.02 kg/m²     Physical Exam  Vitals and nursing note reviewed. Constitutional:       Appearance: He is well-developed. HENT:      Head: Normocephalic. Right Ear: External ear normal.      Left Ear: External ear normal.      Nose: Nose normal.   Eyes:      Conjunctiva/sclera: Conjunctivae normal.      Pupils: Pupils are equal, round, and reactive to light. Neck:      Thyroid: No thyromegaly. Cardiovascular:      Rate and Rhythm: Normal rate and regular rhythm. Heart sounds: Normal heart sounds. Pulmonary:      Effort: Pulmonary effort is normal.      Breath sounds: Normal breath sounds. Abdominal:      Palpations: Abdomen is soft. Tenderness: There is no abdominal tenderness. There is no guarding or rebound. Musculoskeletal:         General: Normal range of motion. Cervical back: Normal range of motion and neck supple. Skin:     General: Skin is dry. Neurological:      Mental Status: He is alert and oriented to person, place, and time.       Deep Tendon Reflexes: Reflexes are normal and symmetric.        Saima Augustin MD

## 2023-09-08 NOTE — ASSESSMENT & PLAN NOTE
Lab Results   Component Value Date    HGBA1C 7.7 (A) 09/08/2023   Slight increase compared to prior  Counseled patient on decreasing carbohydrates in diet.  Counseled on what are carbohydrates, and the difference between simple and complex carbohydrates  Counseled on portions of carbohydrates  Counseled on reading food labels  Increase lean protein

## 2023-09-13 ENCOUNTER — TELEPHONE (OUTPATIENT)
Dept: FAMILY MEDICINE CLINIC | Facility: CLINIC | Age: 58
End: 2023-09-13

## 2023-09-13 NOTE — TELEPHONE ENCOUNTER
PCP SIGNATURE NEEDED FOR RX ADVOCATE (STEGLATRO)  FORM RECEIVED VIA FAX AND PLACED IN PCP FOLDER TO BE DELIVERED AT ASSIGNED TIMES.

## 2023-11-13 ENCOUNTER — TELEPHONE (OUTPATIENT)
Dept: UROLOGY | Facility: CLINIC | Age: 58
End: 2023-11-13

## 2023-11-13 NOTE — TELEPHONE ENCOUNTER
Patient upcoming appointment with Reg Duffy at [facility] 12/1/23 at 2:40 needs to be canceled and rescheduled to 12/22/23 at [facility] with Osmin Padilla with a 9:05 arrival..Letter and mychart message sent to patient.

## 2023-12-11 ENCOUNTER — TELEPHONE (OUTPATIENT)
Dept: UROLOGY | Facility: CLINIC | Age: 58
End: 2023-12-11

## 2023-12-11 NOTE — TELEPHONE ENCOUNTER
Call pt left a detail vm informed pt to get done his PSA prior his apt on 12/22/23. Left office # [phone] in case of any questions.

## 2023-12-22 ENCOUNTER — OFFICE VISIT (OUTPATIENT)
Dept: UROLOGY | Facility: CLINIC | Age: 58
End: 2023-12-22
Payer: COMMERCIAL

## 2023-12-22 VITALS
DIASTOLIC BLOOD PRESSURE: 80 MMHG | WEIGHT: 143 LBS | HEIGHT: 68 IN | OXYGEN SATURATION: 97 % | SYSTOLIC BLOOD PRESSURE: 124 MMHG | HEART RATE: 69 BPM | BODY MASS INDEX: 21.67 KG/M2

## 2023-12-22 DIAGNOSIS — N52.9 ERECTILE DYSFUNCTION, UNSPECIFIED ERECTILE DYSFUNCTION TYPE: ICD-10-CM

## 2023-12-22 DIAGNOSIS — R97.20 ELEVATED PSA: Primary | ICD-10-CM

## 2023-12-22 PROCEDURE — 99214 OFFICE O/P EST MOD 30 MIN: CPT

## 2023-12-22 RX ORDER — SILDENAFIL 50 MG/1
50 TABLET, FILM COATED ORAL DAILY PRN
Qty: 30 TABLET | Refills: 0 | Status: SHIPPED | OUTPATIENT
Start: 2023-12-22
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
## 2023-12-23 ENCOUNTER — APPOINTMENT (OUTPATIENT)
Dept: LAB | Age: 58
End: 2023-12-23
Payer: COMMERCIAL

## 2023-12-23 DIAGNOSIS — E11.65 UNCONTROLLED TYPE 2 DIABETES MELLITUS WITH HYPERGLYCEMIA (HCC): ICD-10-CM

## 2023-12-23 DIAGNOSIS — E78.5 HYPERLIPIDEMIA, UNSPECIFIED HYPERLIPIDEMIA TYPE: ICD-10-CM

## 2023-12-23 LAB
ALBUMIN SERPL BCP-MCNC: 4.2 G/DL (ref 3.5–5)
ALP SERPL-CCNC: 60 U/L (ref 34–104)
ALT SERPL W P-5'-P-CCNC: 23 U/L (ref 7–52)
ANION GAP SERPL CALCULATED.3IONS-SCNC: 7 MMOL/L
AST SERPL W P-5'-P-CCNC: 22 U/L (ref 13–39)
BASOPHILS # BLD AUTO: 0.03 THOUSANDS/ÂΜL (ref 0–0.1)
BASOPHILS NFR BLD AUTO: 1 % (ref 0–1)
BILIRUB SERPL-MCNC: 0.41 MG/DL (ref 0.2–1)
BUN SERPL-MCNC: 16 MG/DL (ref 5–25)
CALCIUM SERPL-MCNC: 9.4 MG/DL (ref 8.4–10.2)
CHLORIDE SERPL-SCNC: 105 MMOL/L (ref 96–108)
CHOLEST SERPL-MCNC: 150 MG/DL
CO2 SERPL-SCNC: 30 MMOL/L (ref 21–32)
CREAT SERPL-MCNC: 0.82 MG/DL (ref 0.6–1.3)
CREAT UR-MCNC: 139.4 MG/DL
EOSINOPHIL # BLD AUTO: 0.21 THOUSAND/ÂΜL (ref 0–0.61)
EOSINOPHIL NFR BLD AUTO: 4 % (ref 0–6)
ERYTHROCYTE [DISTWIDTH] IN BLOOD BY AUTOMATED COUNT: 13.2 % (ref 11.6–15.1)
GFR SERPL CREATININE-BSD FRML MDRD: 97 ML/MIN/1.73SQ M
GLUCOSE P FAST SERPL-MCNC: 144 MG/DL (ref 65–99)
HCT VFR BLD AUTO: 47.5 % (ref 36.5–49.3)
HDLC SERPL-MCNC: 48 MG/DL
HGB BLD-MCNC: 15.9 G/DL (ref 12–17)
IMM GRANULOCYTES # BLD AUTO: 0.01 THOUSAND/UL (ref 0–0.2)
IMM GRANULOCYTES NFR BLD AUTO: 0 % (ref 0–2)
LDLC SERPL CALC-MCNC: 89 MG/DL (ref 0–100)
LYMPHOCYTES # BLD AUTO: 1.69 THOUSANDS/ÂΜL (ref 0.6–4.47)
LYMPHOCYTES NFR BLD AUTO: 35 % (ref 14–44)
MCH RBC QN AUTO: 30.1 PG (ref 26.8–34.3)
MCHC RBC AUTO-ENTMCNC: 33.5 G/DL (ref 31.4–37.4)
MCV RBC AUTO: 90 FL (ref 82–98)
MICROALBUMIN UR-MCNC: <7 MG/L
MICROALBUMIN/CREAT 24H UR: <5 MG/G CREATININE (ref 0–30)
MONOCYTES # BLD AUTO: 0.43 THOUSAND/ÂΜL (ref 0.17–1.22)
MONOCYTES NFR BLD AUTO: 9 % (ref 4–12)
NEUTROPHILS # BLD AUTO: 2.42 THOUSANDS/ÂΜL (ref 1.85–7.62)
NEUTS SEG NFR BLD AUTO: 51 % (ref 43–75)
NONHDLC SERPL-MCNC: 102 MG/DL
NRBC BLD AUTO-RTO: 0 /100 WBCS
PLATELET # BLD AUTO: 272 THOUSANDS/UL (ref 149–390)
PMV BLD AUTO: 10.8 FL (ref 8.9–12.7)
POTASSIUM SERPL-SCNC: 4.5 MMOL/L (ref 3.5–5.3)
PROT SERPL-MCNC: 7.3 G/DL (ref 6.4–8.4)
RBC # BLD AUTO: 5.28 MILLION/UL (ref 3.88–5.62)
SODIUM SERPL-SCNC: 142 MMOL/L (ref 135–147)
TRIGL SERPL-MCNC: 67 MG/DL
TSH SERPL DL<=0.05 MIU/L-ACNC: 1.94 UIU/ML (ref 0.45–4.5)
WBC # BLD AUTO: 4.79 THOUSAND/UL (ref 4.31–10.16)

## 2023-12-23 PROCEDURE — 82043 UR ALBUMIN QUANTITATIVE: CPT

## 2023-12-23 PROCEDURE — 80053 COMPREHEN METABOLIC PANEL: CPT

## 2023-12-23 PROCEDURE — 36415 COLL VENOUS BLD VENIPUNCTURE: CPT

## 2023-12-23 PROCEDURE — 85025 COMPLETE CBC W/AUTO DIFF WBC: CPT

## 2023-12-23 PROCEDURE — 80061 LIPID PANEL: CPT

## 2023-12-23 PROCEDURE — 84443 ASSAY THYROID STIM HORMONE: CPT

## 2023-12-23 PROCEDURE — 82570 ASSAY OF URINE CREATININE: CPT

## 2024-02-03 DIAGNOSIS — E11.65 UNCONTROLLED TYPE 2 DIABETES MELLITUS WITH HYPERGLYCEMIA (HCC): ICD-10-CM

## 2024-02-03 RX ORDER — DAPAGLIFLOZIN 10 MG/1
10 TABLET, FILM COATED ORAL EVERY MORNING
Qty: 90 TABLET | Refills: 0 | Status: SHIPPED | OUTPATIENT
Start: 2024-02-03

## 2024-03-22 ENCOUNTER — OFFICE VISIT (OUTPATIENT)
Dept: FAMILY MEDICINE CLINIC | Facility: CLINIC | Age: 59
End: 2024-03-22

## 2024-03-22 ENCOUNTER — TELEPHONE (OUTPATIENT)
Dept: UROLOGY | Facility: CLINIC | Age: 59
End: 2024-03-22

## 2024-03-22 ENCOUNTER — OFFICE VISIT (OUTPATIENT)
Dept: UROLOGY | Facility: CLINIC | Age: 59
End: 2024-03-22
Payer: COMMERCIAL

## 2024-03-22 ENCOUNTER — APPOINTMENT (OUTPATIENT)
Dept: LAB | Facility: MEDICAL CENTER | Age: 59
End: 2024-03-22
Payer: COMMERCIAL

## 2024-03-22 VITALS
HEART RATE: 86 BPM | SYSTOLIC BLOOD PRESSURE: 106 MMHG | BODY MASS INDEX: 22.02 KG/M2 | WEIGHT: 145.3 LBS | HEIGHT: 68 IN | TEMPERATURE: 98.2 F | DIASTOLIC BLOOD PRESSURE: 62 MMHG | OXYGEN SATURATION: 99 % | RESPIRATION RATE: 18 BRPM

## 2024-03-22 VITALS
HEIGHT: 68 IN | SYSTOLIC BLOOD PRESSURE: 144 MMHG | BODY MASS INDEX: 22.01 KG/M2 | WEIGHT: 145.2 LBS | HEART RATE: 74 BPM | OXYGEN SATURATION: 98 % | DIASTOLIC BLOOD PRESSURE: 92 MMHG

## 2024-03-22 DIAGNOSIS — Z23 ENCOUNTER FOR IMMUNIZATION: ICD-10-CM

## 2024-03-22 DIAGNOSIS — N52.9 ERECTILE DYSFUNCTION, UNSPECIFIED ERECTILE DYSFUNCTION TYPE: ICD-10-CM
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
## 2024-03-22 DIAGNOSIS — E11.9 TYPE 2 DIABETES MELLITUS WITHOUT COMPLICATION, WITHOUT LONG-TERM CURRENT USE OF INSULIN (HCC): ICD-10-CM

## 2024-03-22 DIAGNOSIS — R35.1 BENIGN PROSTATIC HYPERPLASIA WITH NOCTURIA: ICD-10-CM

## 2024-03-22 DIAGNOSIS — R97.20 ELEVATED PSA: Primary | ICD-10-CM

## 2024-03-22 DIAGNOSIS — N40.1 BENIGN PROSTATIC HYPERPLASIA WITH NOCTURIA: ICD-10-CM

## 2024-03-22 DIAGNOSIS — R07.81 RIB PAIN ON LEFT SIDE: ICD-10-CM

## 2024-03-22 DIAGNOSIS — R97.20 ELEVATED PSA: ICD-10-CM

## 2024-03-22 PROBLEM — N20.1 URETERAL STONE: Status: RESOLVED | Noted: 2018-09-08 | Resolved: 2024-03-22

## 2024-03-22 PROBLEM — N35.919 URETHRAL STRICTURE: Status: RESOLVED | Noted: 2018-09-08 | Resolved: 2024-03-22

## 2024-03-22 PROBLEM — N13.5 STRICTURE OF URETER: Status: RESOLVED | Noted: 2018-09-08 | Resolved: 2024-03-22

## 2024-03-22 PROBLEM — N20.1 URETEROLITHIASIS: Status: RESOLVED | Noted: 2018-09-08 | Resolved: 2024-03-22

## 2024-03-22 PROBLEM — R39.11 URINARY HESITANCY: Status: RESOLVED | Noted: 2021-02-19 | Resolved: 2024-03-22

## 2024-03-22 LAB — SL AMB POCT HEMOGLOBIN AIC: 7.4 (ref ?–6.5)

## 2024-03-22 PROCEDURE — 93000 ELECTROCARDIOGRAM COMPLETE: CPT | Performed by: FAMILY MEDICINE

## 2024-03-22 PROCEDURE — 84153 ASSAY OF PSA TOTAL: CPT

## 2024-03-22 PROCEDURE — 99213 OFFICE O/P EST LOW 20 MIN: CPT | Performed by: PHYSICIAN ASSISTANT

## 2024-03-22 PROCEDURE — 83036 HEMOGLOBIN GLYCOSYLATED A1C: CPT | Performed by: FAMILY MEDICINE

## 2024-03-22 PROCEDURE — 90686 IIV4 VACC NO PRSV 0.5 ML IM: CPT | Performed by: FAMILY MEDICINE

## 2024-03-22 PROCEDURE — 36415 COLL VENOUS BLD VENIPUNCTURE: CPT

## 2024-03-22 PROCEDURE — 90480 ADMN SARSCOV2 VAC 1/ONLY CMP: CPT | Performed by: FAMILY MEDICINE

## 2024-03-22 PROCEDURE — 91320 SARSCV2 VAC 30MCG TRS-SUC IM: CPT | Performed by: FAMILY MEDICINE

## 2024-03-22 PROCEDURE — 84154 ASSAY OF PSA FREE: CPT

## 2024-03-22 PROCEDURE — 99213 OFFICE O/P EST LOW 20 MIN: CPT | Performed by: FAMILY MEDICINE

## 2024-03-22 PROCEDURE — 90471 IMMUNIZATION ADMIN: CPT | Performed by: FAMILY MEDICINE

## 2024-03-22 RX ORDER — SILDENAFIL 50 MG/1
50 TABLET, FILM COATED ORAL DAILY PRN
Qty: 30 TABLET | Refills: 0 | Status: SHIPPED | OUTPATIENT
Start: 2024-03-22

## 2024-03-22 RX ORDER — DAPAGLIFLOZIN 10 MG/1
10 TABLET, FILM COATED ORAL EVERY MORNING
Qty: 90 TABLET | Refills: 0 | Status: SHIPPED | OUTPATIENT
Start: 2024-03-22 | End: 2024-03-22

## 2024-03-22 RX ORDER — TAMSULOSIN HYDROCHLORIDE 0.4 MG/1
0.4 CAPSULE ORAL
Qty: 90 CAPSULE | Refills: 3 | Status: SHIPPED | OUTPATIENT
Start: 2024-03-22 | End: 2024-03-22 | Stop reason: SDUPTHER

## 2024-03-22 RX ORDER — LIDOCAINE 50 MG/G
OINTMENT TOPICAL AS NEEDED
Qty: 106.32 G | Refills: 1 | Status: SHIPPED | OUTPATIENT
Start: 2024-03-22

## 2024-03-22 RX ORDER — TAMSULOSIN HYDROCHLORIDE 0.4 MG/1
0.4 CAPSULE ORAL
Qty: 90 CAPSULE | Refills: 3 | Status: SHIPPED | OUTPATIENT
Start: 2024-03-22 | End: 2024-06-20

## 2024-03-22 RX ORDER — DAPAGLIFLOZIN 5 MG/1
5 TABLET, FILM COATED ORAL EVERY MORNING
Qty: 90 TABLET | Refills: 1 | Status: SHIPPED | OUTPATIENT
Start: 2024-03-22

## 2024-03-22 RX ORDER — ATORVASTATIN CALCIUM 20 MG/1
20 TABLET, FILM COATED ORAL DAILY
Qty: 90 TABLET | Refills: 3 | Status: SHIPPED | OUTPATIENT
Start: 2024-03-22

## 2024-03-22 NOTE — ASSESSMENT & PLAN NOTE
Baseline PSA low-1-range  1 transient PSA elevation to 4.2 last spring  Overdue for repeat PSA-I asked him to have his blood drawn while he is here in the building today I will call him with results on Monday, if it is back to his baseline we will resume annual screening, if is persistently elevated over 4 we talked about neck step multiparametric MRI for use for in network fusion biopsies

## 2024-03-22 NOTE — PROGRESS NOTES
Name: Nahid Solimna      : 1965      MRN: [id number]  Encounter Provider: Elias Schoen, MD  Encounter Date: 3/22/2024   Encounter department: [facility]    Assessment & Plan     1. Fall, initial encounter  Assessment & Plan:  Unclear reason for fall, although orthostatic vital signs were inconclusive in the office today, most likely orthostatic cause.  Patient with low baseline blood pressure, likely Farxiga is acting as a diuretic and that in combination with poor p.o. intake caused this episode.  EKG in office today normal sinus rhythm, borderline right bundle.  Patient did not lose consciousness, less likely neurological in nature.  Also not likely cardiogenic based on normal EKG and symptoms.  - Decreasing Farxiga to 5 mg daily to avoid any more hypotensive episodes    Orders:  -     XR chest pa & lateral; Future; Expected date: 2024  -     POCT ECG  -     lidocaine (XYLOCAINE) 5 % ointment; Apply topically as needed for mild pain  -     XR ribs bilateral 4+ vw w pa chest; Future; Expected date: 2024    2. Rib pain on left side  -     XR chest pa & lateral; Future; Expected date: 2024  -     POCT ECG  -     lidocaine (XYLOCAINE) 5 % ointment; Apply topically as needed for mild pain  -     XR ribs bilateral 4+ vw w pa chest; Future; Expected date: 2024    3. Type 2 diabetes mellitus without complication, without long-term current use of insulin (HCC)  Assessment & Plan:  Diabetes relatively well-controlled.  Adding metformin to her regimen as he will likely derive benefit from being on metformin such as longevity.  Decreasing Farxiga for reasons as above.  - Follow-up with A1c in 3 months  - Restart metformin 500 mg twice daily  Lab Results   Component Value Date    HGBA1C 7.4 (A) 2024       Orders:  -     Diabetic foot exam; Future  -     POCT hemoglobin A1c  -     metFORMIN (GLUCOPHAGE) 500 mg tablet; Take 1 tablet (500 mg total) by  mouth 2 (two) times a day with meals  -     atorvastatin (LIPITOR) 20 mg tablet; Take 1 tablet (20 mg total) by mouth daily  -     dapagliflozin (Farxiga) 5 MG TABS; Take 1 tablet (5 mg total) by mouth every morning    4. Encounter for immunization  -     influenza vaccine, quadrivalent, 0.5 mL, preservative-free, for adult and pediatric patients 6 mos+ (AFLURIA, FLUARIX, FLULAVAL, FLUZONE)  -     COVID-19 Pfizer mRNA vaccine 12 yr and older (GRAY cap vial or pre-filled syringe)    5. 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  He thought he was going lose consciousness but never did, he fell and hit his left ribs which have been bothering him ever since.  He did not seek medical attention at that time.  Ever since then he has been having abnormal sensation in his chest.  He states that it is tender on the left side but seems to be improving.  He still feels off and not back to his baseline.  He has not had an episode of presyncope since then.      Review of Systems   Constitutional:  Negative for fatigue and fever.   Respiratory:  Negative for shortness of breath.    Cardiovascular:  Positive for chest pain.   Gastrointestinal:  Negative for abdominal pain, constipation and diarrhea.   Genitourinary:  Negative for dysuria.   Skin:  Negative for rash.   Neurological:  Negative for dizziness.       Current Outpatient Medications on File Prior to Visit   Medication Sig    hydrocortisone (ANUSOL-HC) 2.5 % rectal cream Apply topically 2 (two) times a day    tamsulosin (Flomax) 0.4 mg Take 1 capsule (0.4 mg total) by mouth daily with dinner for 90 doses  "   triamcinolone (KENALOG) 0.5 % cream Apply topically 3 (three) times a day    [DISCONTINUED] ammonium lactate (LAC-HYDRIN) 12 % cream Apply topically as needed for dry skin    [DISCONTINUED] clotrimazole (LOTRIMIN) 1 % cream Apply topically 2 (two) times a day    [DISCONTINUED] Farxiga 10 MG tablet TAKE 1 TABLET BY MOUTH ONCE DAILY IN THE MORNING    [DISCONTINUED] sildenafil (VIAGRA) 50 MG tablet Take 1 tablet (50 mg total) by mouth daily as needed for erectile dysfunction    [DISCONTINUED] simvastatin (ZOCOR) 40 mg tablet Take 1 tablet (40 mg total) by mouth daily at bedtime    [DISCONTINUED] tamsulosin (Flomax) 0.4 mg Take 1 capsule (0.4 mg total) by mouth daily with dinner for 90 doses    [DISCONTINUED] tamsulosin (Flomax) 0.4 mg Take 1 capsule (0.4 mg total) by mouth daily with dinner for 90 doses       Objective     /62 Comment: orthostatic BP standing up @ 5 min  Pulse 86   Temp 98.2 °F (36.8 °C) (Temporal)   Resp 18   Ht 5' 8\" (1.727 m)   Wt 65.9 kg (145 lb 4.8 oz)   SpO2 99%   BMI 22.09 kg/m²     Physical Exam  Vitals reviewed.   Constitutional:       Appearance: Normal appearance.   HENT:      Head: Normocephalic.      Nose: Nose normal.   Eyes:      Extraocular Movements: Extraocular movements intact.      Pupils: Pupils are equal, round, and reactive to light.   Cardiovascular:      Rate and Rhythm: Normal rate.      Pulses: no weak pulses.           Dorsalis pedis pulses are 2+ on the right side and 2+ on the left side.        Posterior tibial pulses are 2+ on the right side and 2+ on the left side.   Pulmonary:      Effort: Pulmonary effort is normal.   Chest:      Chest wall: Tenderness present.   Musculoskeletal:         General: Normal range of motion.      Cervical back: Normal range of motion.   Feet:      Right foot:      Skin integrity: No ulcer, skin breakdown, erythema, warmth, callus or dry skin.      Left foot:      Skin integrity: No ulcer, skin breakdown, erythema, warmth, " "callus or dry skin.   Neurological:      General: No focal deficit present.      Mental Status: He is alert and oriented to person, place, and time.   Psychiatric:         Mood and Affect: Mood normal.       Diabetic Foot Exam    Patient's shoes and socks removed.    Right Foot/Ankle   Right Foot Inspection  Skin Exam: skin normal and skin intact. No dry skin, no warmth, no callus, no erythema, no maceration, no abnormal color, no pre-ulcer, no ulcer and no callus.     Toe Exam: ROM and strength within normal limits.     Vascular  Capillary refills: < 3 seconds  The right DP pulse is 2+. The right PT pulse is 2+.     Left Foot/Ankle  Left Foot Inspection  Skin Exam: skin normal and skin intact. No dry skin, no warmth, no erythema, no maceration, normal color, no pre-ulcer, no ulcer and no callus.     Toe Exam: ROM and strength within normal limits.     Vascular  Capillary refills: < 3 seconds  The left DP pulse is 2+. The left PT pulse is 2+.     Assign Risk Category  No deformity present  No loss of protective sensation  No weak pulses  Risk: 0      Portions of the record were created with voice recognition software.  Occasional wrong word or \"sound a like\" substitutions may have occurred due to the inherent limitations of voice recognition software.  Read the chart carefully and recognize, using context, where substitutions have occurred.      Elias Schoen, MD    "
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
## 2024-03-23 LAB
PSA FREE MFR SERPL: 16.6 %
PSA FREE SERPL-MCNC: 0.73 NG/ML
PSA SERPL-MCNC: 4.4 NG/ML (ref 0–4)

## 2024-03-23 NOTE — ASSESSMENT & PLAN NOTE
Unclear reason for fall, although orthostatic vital signs were inconclusive in the office today, most likely orthostatic cause.  Patient with low baseline blood pressure, likely Farxiga is acting as a diuretic and that in combination with poor p.o. intake caused this episode.  EKG in office today normal sinus rhythm, borderline right bundle.  Patient did not lose consciousness, less likely neurological in nature.  Also not likely cardiogenic based on normal EKG and symptoms.  - Decreasing Farxiga to 5 mg daily to avoid any more hypotensive episodes

## 2024-03-23 NOTE — ASSESSMENT & PLAN NOTE
Diabetes relatively well-controlled.  Adding metformin to her regimen as he will likely derive benefit from being on metformin such as longevity.  Decreasing Farxiga for reasons as above.  - Follow-up with A1c in 3 months  - Restart metformin 500 mg twice daily  Lab Results   Component Value Date    HGBA1C 7.4 (A) 03/22/2024

## 2024-03-29 ENCOUNTER — HOSPITAL ENCOUNTER (OUTPATIENT)
Dept: RADIOLOGY | Facility: HOSPITAL | Age: 59
End: 2024-03-29
Payer: COMMERCIAL
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
## 2024-03-29 DIAGNOSIS — R07.81 RIB PAIN ON LEFT SIDE: ICD-10-CM

## 2024-03-29 PROCEDURE — 71046 X-RAY EXAM CHEST 2 VIEWS: CPT

## 2024-03-29 PROCEDURE — 71110 X-RAY EXAM RIBS BIL 3 VIEWS: CPT

## 2024-04-08 ENCOUNTER — TELEPHONE (OUTPATIENT)
Dept: UROLOGY | Facility: CLINIC | Age: 59
End: 2024-04-08

## 2024-04-08 NOTE — TELEPHONE ENCOUNTER
----- Message from Gael Flores MD sent at 4/5/2024  2:52 PM EDT -----  Waleska saw the patient in March.  She recommended multiparametric MRI if the PSA remained elevated.  Can we please arrange multiparametric MRI, patient will likely need .

## 2024-04-08 NOTE — TELEPHONE ENCOUNTER
Called patient with the help of NIRMAL Mack for [place]uran translation. Left the patient a detailed vm, per communication consent. Martina informed the pt that his PSA remained elevated and informed him to call [phone] to scheduled a multiparametric MRI. Also left office number in case of any questions or concerns.

## 2024-04-10 NOTE — TELEPHONE ENCOUNTER
Pt's wife called using  # [phone]  And was informed of the msg. Wife was given CS ph# to schedule MRI

## 2024-04-19 ENCOUNTER — HOSPITAL ENCOUNTER (OUTPATIENT)
Facility: MEDICAL CENTER | Age: 59
Discharge: HOME | End: 2024-04-19
Payer: COMMERCIAL

## 2024-04-19 DIAGNOSIS — R97.20 ELEVATED PSA: ICD-10-CM

## 2024-04-19 PROCEDURE — 72197 MRI PELVIS W/O & W/DYE: CPT

## 2024-04-19 PROCEDURE — A9585 GADOBUTROL INJECTION: HCPCS | Performed by: UROLOGY

## 2024-04-19 PROCEDURE — 76377 3D RENDER W/INTRP POSTPROCES: CPT

## 2024-04-19 RX ORDER — GADOBUTROL 604.72 MG/ML
6 INJECTION INTRAVENOUS
Status: COMPLETED | OUTPATIENT
Start: 2024-04-19 | End: 2024-04-19

## 2024-04-19 RX ADMIN — GADOBUTROL 6 ML: 604.72 INJECTION INTRAVENOUS at 13:05

## 2024-04-29 ENCOUNTER — TELEPHONE (OUTPATIENT)
Dept: UROLOGY | Facility: CLINIC | Age: 59
End: 2024-04-29

## 2024-04-29 DIAGNOSIS — R97.20 ELEVATED PSA: Primary | ICD-10-CM

## 2024-04-29 NOTE — TELEPHONE ENCOUNTER
----- Message from Gael Flores MD sent at 4/29/2024 12:56 PM EDT -----  Multiparametric MRI shows a 40 g prostate without nodule or concern for underlying lesion.  Would recommend patient return in 3 months with updated PSA for trending

## 2024-04-30 ENCOUNTER — TELEPHONE (OUTPATIENT)
Dept: UROLOGY | Facility: CLINIC | Age: 59
End: 2024-04-30

## 2024-05-01 NOTE — TELEPHONE ENCOUNTER
"Call placed to patient with  [phone]- there was no answer and unable to leave a voicemail. Call placed to patients spouse who is listed on communication consent and received message \"the additional person did not answer\". Again unable to leave a VM. Will try back at a later time.   "

## 2024-05-02 NOTE — TELEPHONE ENCOUNTER
"Contacted both Nahid and his spouse with  [phone]. Again received the same message on both lines: \"the additional person did not answer.\" Unable to leave a voicemail on either phone.   "

## 2024-05-03 NOTE — TELEPHONE ENCOUNTER
3rd attempt at contacting both patient and spouse with  [phone]. There was no answer and unable to leave a VM. Also attempted contacting patients daughter Jeanette who is listed on communication consent, however phone is not accepting calls at this time. Will forward to clerical to send certified letter.

## 2024-05-10 NOTE — TELEPHONE ENCOUNTER
Patient called in as he received letter. Patient got new phone #. Updated in chart. Scheduled 3 month follow up, PSA prior, first avail. f/u 11/15/24, as pt. requested Friday only.  Relayed the following results:  ----- Message from Gael Flores MD sent at 4/29/2024 12:56 PM EDT -----  Multiparametric MRI shows a 40 g prostate without nodule or concern for underlying lesion.  Would recommend patient return in 3 months with updated PSA for trending     Patient verbalized understanding and has no questions or concerns at this time.

## 2024-05-10 NOTE — TELEPHONE ENCOUNTER
Contacted patient and made him aware plan is to keep appointment as scheduled in November, however patient should have PSA blood work completed in July. He verbalized understanding.

## 2024-05-10 NOTE — TELEPHONE ENCOUNTER
agree- should have psa still at the 3 months surya around july, and can be seen November in person

## 2024-07-16 ENCOUNTER — TELEPHONE (OUTPATIENT)
Dept: UROLOGY | Facility: AMBULATORY SURGERY CENTER | Age: 59
End: 2024-07-16

## 2024-10-18 ENCOUNTER — OFFICE VISIT (OUTPATIENT)
Dept: FAMILY MEDICINE CLINIC | Facility: CLINIC | Age: 59
End: 2024-10-18

## 2024-10-18 VITALS
SYSTOLIC BLOOD PRESSURE: 120 MMHG | RESPIRATION RATE: 16 BRPM | WEIGHT: 145 LBS | TEMPERATURE: 98 F | BODY MASS INDEX: 21.98 KG/M2 | HEART RATE: 72 BPM | DIASTOLIC BLOOD PRESSURE: 70 MMHG | OXYGEN SATURATION: 99 % | HEIGHT: 68 IN

## 2024-10-18 DIAGNOSIS — G89.29 CHRONIC BILATERAL LOW BACK PAIN WITH RIGHT-SIDED SCIATICA: ICD-10-CM

## 2024-10-18 DIAGNOSIS — R07.89 CHEST WALL PAIN: ICD-10-CM

## 2024-10-18 DIAGNOSIS — M54.41 CHRONIC BILATERAL LOW BACK PAIN WITH RIGHT-SIDED SCIATICA: ICD-10-CM

## 2024-10-18 DIAGNOSIS — J32.9 CHRONIC SINUSITIS, UNSPECIFIED LOCATION: ICD-10-CM

## 2024-10-18 DIAGNOSIS — E11.9 TYPE 2 DIABETES MELLITUS WITHOUT COMPLICATION, WITHOUT LONG-TERM CURRENT USE OF INSULIN (HCC): Primary | ICD-10-CM

## 2024-10-18 DIAGNOSIS — E78.5 HYPERLIPIDEMIA, UNSPECIFIED HYPERLIPIDEMIA TYPE: ICD-10-CM

## 2024-10-18 DIAGNOSIS — H93.13 TINNITUS OF BOTH EARS: ICD-10-CM

## 2024-10-18 LAB — SL AMB POCT HEMOGLOBIN AIC: 7.5 (ref ?–6.5)

## 2024-10-18 PROCEDURE — 99214 OFFICE O/P EST MOD 30 MIN: CPT | Performed by: NURSE PRACTITIONER

## 2024-10-18 PROCEDURE — 83036 HEMOGLOBIN GLYCOSYLATED A1C: CPT | Performed by: NURSE PRACTITIONER

## 2024-10-18 RX ORDER — ATORVASTATIN CALCIUM 20 MG/1
20 TABLET, FILM COATED ORAL DAILY
Qty: 90 TABLET | Refills: 3 | Status: SHIPPED | OUTPATIENT
Start: 2024-10-18

## 2024-10-18 RX ORDER — FLUTICASONE PROPIONATE 50 MCG
1 SPRAY, SUSPENSION (ML) NASAL DAILY
Qty: 16 G | Refills: 1 | Status: SHIPPED | OUTPATIENT
Start: 2024-10-18

## 2024-10-18 NOTE — ASSESSMENT & PLAN NOTE
Lab Results   Component Value Date    CHOLESTEROL 150 12/23/2023    CHOLESTEROL 121 05/14/2022    CHOLESTEROL 143 02/28/2020     Lab Results   Component Value Date    HDL 48 12/23/2023    HDL 37 (L) 05/14/2022    HDL 39 (L) 02/28/2020     Lab Results   Component Value Date    TRIG 67 12/23/2023    TRIG 90 05/14/2022    TRIG 98 02/28/2020     Lab Results   Component Value Date    NONHDLC 102 12/23/2023    NONHDLC 84 05/14/2022    NONHDLC 104 02/28/2020     Lab Results   Component Value Date    LDLCALC 89 12/23/2023     Continue     Orders:    Lipid panel; Future

## 2024-10-18 NOTE — PROGRESS NOTES
Ambulatory Visit  Name: Nahid Soliman      : 1965      MRN: [id number]  Encounter Provider: MODESTO Wynn  Encounter Date: 10/18/2024   Encounter department: [facility] [place]    Assessment & Plan  Type 2 diabetes mellitus without complication, without long-term current use of insulin (HCC)    Lab Results   Component Value Date    HGBA1C 7.5 (A) 10/18/2024     Experiencing urinary frequency/ dysuria - D/C Dapa  Increase metformin to 1,000 mg bid   Continue statin, no ace/arb   UTD eye and foot exams     Orders:    POCT hemoglobin A1c    atorvastatin (LIPITOR) 20 mg tablet; Take 1 tablet (20 mg total) by mouth daily    metFORMIN (GLUCOPHAGE) 1000 MG tablet; Take 1 tablet (1,000 mg total) by mouth 2 (two) times a day with meals    Basic metabolic panel; Future    Urinalysis with microscopic; Future    Chest wall pain  Chronic >6 months, injury at home, walked into a pole in his basement   Xray at that time showed no fracture, continues with chest wall tenderness on exertion, palpation, worse HS   Check CT scan / EKG       Orders:    CT chest wo contrast; Future    ECG 12 lead; Future    Diclofenac Sodium (VOLTAREN) 1 %; Apply 2 g topically 4 (four) times a day    Chronic bilateral low back pain with right-sided sciatica  No specific injury   Consider PT     Orders:    XR spine lumbar minimum 4 views non injury; Future    Diclofenac Sodium (VOLTAREN) 1 %; Apply 2 g topically 4 (four) times a day    Hyperlipidemia, unspecified hyperlipidemia type  Lab Results   Component Value Date    CHOLESTEROL 150 2023    CHOLESTEROL 121 2022    CHOLESTEROL 143 2020     Lab Results   Component Value Date    HDL 48 2023    HDL 37 (L) 2022    HDL 39 (L) 2020     Lab Results   Component Value Date    TRIG 67 2023    TRIG 90 2022    TRIG 98 2020     Lab Results   Component Value Date    NONHDLC 102 2023    NONHDLC 84  05/14/2022    [facility] 104 02/28/2020     Lab Results   Component Value Date    LDLCALC 89 12/23/2023     Continue     Orders:    Lipid panel; Future    Chronic sinusitis, unspecified location    Orders:    fluticasone (FLONASE) 50 mcg/act nasal spray; 1 spray into each nostril daily    Tinnitus of both ears    Orders:    fluticasone (FLONASE) 50 mcg/act nasal spray; 1 spray into each nostril daily      Depression Screening and Follow-up Plan: Patient was screened for depression during today's encounter. They screened negative with a PHQ-2 score of 0.      History of Present Illness     Patient is a 58 yo male who  has a past medical history of Chronic pain disorder, Closed fracture of fibula, Dental disorder, Diabetes mellitus (HCC), Fracture of ankle, and Hyperlipidemia. He is here for follow up. Reports ongoing chest wall pain L>R for more than 6 months after walking into a pole in his basement. He notes that the pain is worse with lifting and when laying at night. He also reports chronic bilateral low back pain. No injury or trauma. Denies numbness/ tingling, incontinence, falls, weakness, fever, saddle anesthesia. Also reports one week of intermittent bilateral tinnitus and sinus/ nasal congestion. No cough or fever.        The following portions of the patient's history were reviewed and updated as appropriate: allergies, current medications, past family history, past medical history, past social history, past surgical history and problem list.            Review of Systems   Constitutional:  Negative for activity change, appetite change, chills, fatigue, fever and unexpected weight change.   HENT:  Positive for congestion and tinnitus. Negative for hearing loss, nosebleeds, sinus pain, sneezing, sore throat and trouble swallowing.    Eyes:  Negative for photophobia and visual disturbance.   Respiratory:  Negative for cough, chest tightness, shortness of breath and wheezing.    Cardiovascular:  Positive for  "chest pain. Negative for palpitations and leg swelling.   Gastrointestinal:  Negative for abdominal pain, constipation, nausea and vomiting.   Genitourinary:  Negative for decreased urine volume, difficulty urinating, dysuria, flank pain, genital sores, hematuria and urgency.   Musculoskeletal:  Positive for arthralgias, back pain and myalgias. Negative for gait problem.   Skin:  Negative for pallor, rash and wound.   Neurological:  Negative for dizziness, seizures, syncope, weakness, numbness and headaches.   Hematological:  Negative for adenopathy. Does not bruise/bleed easily.   Psychiatric/Behavioral:  Negative for confusion, hallucinations, self-injury, sleep disturbance and suicidal ideas. The patient is not nervous/anxious.            Objective     /70 (BP Location: Right arm, Patient Position: Sitting, Cuff Size: Standard)   Pulse 72   Temp 98 °F (36.7 °C) (Temporal)   Resp 16   Ht 5' 8\" (1.727 m)   Wt 65.8 kg (145 lb)   SpO2 99%   BMI 22.05 kg/m²     Physical Exam  Vitals and nursing note reviewed.   Constitutional:       General: He is not in acute distress.     Appearance: Normal appearance. He is not diaphoretic.   HENT:      Head: Normocephalic and atraumatic.      Right Ear: Tympanic membrane and external ear normal.      Left Ear: Tympanic membrane and external ear normal.      Nose: Congestion present.   Eyes:      General:         Right eye: No discharge.         Left eye: No discharge.      Conjunctiva/sclera: Conjunctivae normal.   Cardiovascular:      Rate and Rhythm: Normal rate and regular rhythm.   Pulmonary:      Effort: Pulmonary effort is normal. No respiratory distress.      Breath sounds: Normal breath sounds.   Musculoskeletal:      Cervical back: Normal range of motion and neck supple.      Lumbar back: Tenderness present. Normal range of motion. Positive left straight leg raise test. Negative right straight leg raise test.      Right lower leg: No edema.      Left lower " leg: No edema.   Skin:     General: Skin is warm and dry.   Neurological:      General: No focal deficit present.      Mental Status: He is alert and oriented to person, place, and time.   Psychiatric:         Mood and Affect: Mood normal.         Behavior: Behavior normal.

## 2024-10-18 NOTE — ASSESSMENT & PLAN NOTE
Lab Results   Component Value Date    HGBA1C 7.5 (A) 10/18/2024     Experiencing urinary frequency/ dysuria - D/C Dapa  Increase metformin to 1,000 mg bid   Continue statin, no ace/arb   UTD eye and foot exams     Orders:    POCT hemoglobin A1c    atorvastatin (LIPITOR) 20 mg tablet; Take 1 tablet (20 mg total) by mouth daily    metFORMIN (GLUCOPHAGE) 1000 MG tablet; Take 1 tablet (1,000 mg total) by mouth 2 (two) times a day with meals    Basic metabolic panel; Future    Urinalysis with microscopic; Future

## 2024-10-22 ENCOUNTER — HOSPITAL ENCOUNTER (OUTPATIENT)
Dept: RADIOLOGY | Facility: HOSPITAL | Age: 59
Discharge: HOME | End: 2024-10-22
Payer: COMMERCIAL

## 2024-10-22 DIAGNOSIS — M54.41 CHRONIC BILATERAL LOW BACK PAIN WITH RIGHT-SIDED SCIATICA: ICD-10-CM

## 2024-10-22 DIAGNOSIS — G89.29 CHRONIC BILATERAL LOW BACK PAIN WITH RIGHT-SIDED SCIATICA: ICD-10-CM

## 2024-10-22 PROCEDURE — 72110 X-RAY EXAM L-2 SPINE 4/>VWS: CPT

## 2024-10-28 ENCOUNTER — APPOINTMENT (OUTPATIENT)
Dept: LAB | Age: 59
End: 2024-10-28
Payer: COMMERCIAL

## 2024-10-28 ENCOUNTER — OFFICE VISIT (OUTPATIENT)
Dept: URGENT CARE | Age: 59
End: 2024-10-28
Payer: COMMERCIAL

## 2024-10-28 VITALS
TEMPERATURE: 97.1 F | WEIGHT: 141.4 LBS | DIASTOLIC BLOOD PRESSURE: 62 MMHG | RESPIRATION RATE: 18 BRPM | HEART RATE: 65 BPM | HEIGHT: 68 IN | SYSTOLIC BLOOD PRESSURE: 114 MMHG | BODY MASS INDEX: 21.43 KG/M2 | OXYGEN SATURATION: 99 %

## 2024-10-28 DIAGNOSIS — H93.13 TINNITUS OF BOTH EARS: Primary | ICD-10-CM

## 2024-10-28 DIAGNOSIS — E11.9 TYPE 2 DIABETES MELLITUS WITHOUT COMPLICATION, WITHOUT LONG-TERM CURRENT USE OF INSULIN (HCC): ICD-10-CM

## 2024-10-28 DIAGNOSIS — E78.5 HYPERLIPIDEMIA, UNSPECIFIED HYPERLIPIDEMIA TYPE: ICD-10-CM

## 2024-10-28 LAB
ANION GAP SERPL CALCULATED.3IONS-SCNC: 8 MMOL/L (ref 4–13)
BACTERIA UR QL AUTO: ABNORMAL /HPF
BILIRUB UR QL STRIP: NEGATIVE
BUN SERPL-MCNC: 12 MG/DL (ref 5–25)
CALCIUM SERPL-MCNC: 9.3 MG/DL (ref 8.4–10.2)
CHLORIDE SERPL-SCNC: 103 MMOL/L (ref 96–108)
CHOLEST SERPL-MCNC: 137 MG/DL
CLARITY UR: ABNORMAL
CO2 SERPL-SCNC: 31 MMOL/L (ref 21–32)
COLOR UR: ABNORMAL
CREAT SERPL-MCNC: 0.74 MG/DL (ref 0.6–1.3)
GFR SERPL CREATININE-BSD FRML MDRD: 100 ML/MIN/1.73SQ M
GLUCOSE P FAST SERPL-MCNC: 169 MG/DL (ref 65–99)
GLUCOSE UR STRIP-MCNC: NEGATIVE MG/DL
HDLC SERPL-MCNC: 44 MG/DL
HGB UR QL STRIP.AUTO: NEGATIVE
KETONES UR STRIP-MCNC: NEGATIVE MG/DL
LDLC SERPL CALC-MCNC: 70 MG/DL (ref 0–100)
LEUKOCYTE ESTERASE UR QL STRIP: NEGATIVE
MUCOUS THREADS UR QL AUTO: ABNORMAL
NITRITE UR QL STRIP: NEGATIVE
NON-SQ EPI CELLS URNS QL MICRO: ABNORMAL /HPF
NONHDLC SERPL-MCNC: 93 MG/DL
PH UR STRIP.AUTO: 5.5 [PH]
POTASSIUM SERPL-SCNC: 4.1 MMOL/L (ref 3.5–5.3)
PROT UR STRIP-MCNC: ABNORMAL MG/DL
RBC #/AREA URNS AUTO: ABNORMAL /HPF
SODIUM SERPL-SCNC: 142 MMOL/L (ref 135–147)
SP GR UR STRIP.AUTO: 1.02 (ref 1–1.03)
TRIGL SERPL-MCNC: 114 MG/DL
UROBILINOGEN UR STRIP-ACNC: <2 MG/DL
WBC #/AREA URNS AUTO: ABNORMAL /HPF

## 2024-10-28 PROCEDURE — 80061 LIPID PANEL: CPT

## 2024-10-28 PROCEDURE — S9083 URGENT CARE CENTER GLOBAL: HCPCS

## 2024-10-28 PROCEDURE — 81001 URINALYSIS AUTO W/SCOPE: CPT

## 2024-10-28 PROCEDURE — G0382 LEV 3 HOSP TYPE B ED VISIT: HCPCS

## 2024-10-28 PROCEDURE — 80048 BASIC METABOLIC PNL TOTAL CA: CPT
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
## 2024-11-01 ENCOUNTER — HOSPITAL ENCOUNTER (OUTPATIENT)
Dept: CT IMAGING | Facility: HOSPITAL | Age: 59
Discharge: HOME | End: 2024-11-01
Payer: COMMERCIAL

## 2024-11-01 DIAGNOSIS — R07.89 CHEST WALL PAIN: ICD-10-CM

## 2024-11-01 PROCEDURE — 71250 CT THORAX DX C-: CPT

## 2024-11-08 ENCOUNTER — OFFICE VISIT (OUTPATIENT)
Dept: UROLOGY | Facility: MEDICAL CENTER | Age: 59
End: 2024-11-08
Payer: COMMERCIAL

## 2024-11-08 VITALS
OXYGEN SATURATION: 99 % | DIASTOLIC BLOOD PRESSURE: 60 MMHG | SYSTOLIC BLOOD PRESSURE: 120 MMHG | WEIGHT: 147 LBS | HEIGHT: 68 IN | HEART RATE: 75 BPM | BODY MASS INDEX: 22.28 KG/M2

## 2024-11-08 DIAGNOSIS — N52.9 ERECTILE DYSFUNCTION, UNSPECIFIED ERECTILE DYSFUNCTION TYPE: ICD-10-CM

## 2024-11-08 DIAGNOSIS — R35.1 BENIGN PROSTATIC HYPERPLASIA WITH NOCTURIA: ICD-10-CM

## 2024-11-08 DIAGNOSIS — R97.20 ELEVATED PSA: Primary | ICD-10-CM

## 2024-11-08 DIAGNOSIS — N40.1 BENIGN PROSTATIC HYPERPLASIA WITH NOCTURIA: ICD-10-CM

## 2024-11-08 PROCEDURE — 99213 OFFICE O/P EST LOW 20 MIN: CPT | Performed by: PHYSICIAN ASSISTANT

## 2024-11-08 RX ORDER — SILDENAFIL 100 MG/1
TABLET, FILM COATED ORAL
Qty: 30 TABLET | Refills: 3 | Status: SHIPPED | OUTPATIENT
Start: 2024-11-08

## 2024-11-08 RX ORDER — TAMSULOSIN HYDROCHLORIDE 0.4 MG/1
0.4 CAPSULE ORAL
Qty: 90 CAPSULE | Refills: 3 | Status: SHIPPED | OUTPATIENT
Start: 2024-11-08

## 2024-11-08 NOTE — ASSESSMENT & PLAN NOTE
increase viagra from 50 to 100mg on demand dosing  Orders:    sildenafil (VIAGRA) 100 mg tablet; Take one tablet on empty stomach one hour prior to sex

## 2024-11-08 NOTE — PROGRESS NOTES
Follow-up BPH on Flomax, erectile dysfunction on sildenafil, elevated PSA with 1 transient elevation  ambulatory Visit  Name: Nahid Soliman      : 1965      MRN: [id number]  Encounter Provider: Waleska Dorsey PA-C  Encounter Date: 2024   Encounter department: [facility] [place]    Assessment & Plan  Benign prostatic hyperplasia with nocturia  resume tamsulosin 0.4mg qhs; helpful for nocturia and postvoid dribbling  Orders:    tamsulosin (Flomax) 0.4 mg; Take 1 capsule (0.4 mg total) by mouth daily with dinner    Erectile dysfunction, unspecified erectile dysfunction type  increase viagra from 50 to 100mg on demand dosing  Orders:    sildenafil (VIAGRA) 100 mg tablet; Take one tablet on empty stomach one hour prior to sex    Elevated PSA  MRI prostate 2024: 40cc prostate no nodule/target pirads 2 low risk  no biopsy  continue psa monitoring- due now  psa now then again in six months  Orders:    PSA Total, Diagnostic; Future      Lab Results   Component Value Date    PSA 4.4 (H) 2024    PSA 4.2 2023    PSA 1.3 2021       History of Present Illness     Nahid Soliman is a 59 y.o. male who presents routine follow-up elevated PSA previous baseline 1-1.5, elevated to just over 4 in the spring 2023, repeats remain elevated around 4.  He underwent multiparametric MRI in spring 2024 showing 40 g prostate no nodule or underlying lesion, PI-RADS 2 was low risk, he has not been recommended biopsy however continued PSA monitoring.  He presents today overdue for updated PSA.    History obtained from : patient  Review of Systems   Constitutional: Negative.    Respiratory: Negative.     Cardiovascular: Negative.    Genitourinary:  Negative for decreased urine volume, difficulty urinating, dysuria, flank pain, frequency, hematuria and urgency.   Musculoskeletal: Negative.              Objective     /60 (BP Location: Right arm, Patient Position: Sitting,  "Cuff Size: Adult)   Pulse 75   Ht 5' 8\" (1.727 m)   Wt 66.7 kg (147 lb)   SpO2 99%   BMI 22.35 kg/m²   Physical Exam  Vitals and nursing note reviewed.   Constitutional:       General: He is not in acute distress.     Appearance: He is well-developed. He is not diaphoretic.   HENT:      Head: Normocephalic and atraumatic.   Pulmonary:      Effort: Pulmonary effort is normal.   Musculoskeletal:      Right lower leg: No edema.      Left lower leg: No edema.   Skin:     General: Skin is warm.   Neurological:      Mental Status: He is alert and oriented to person, place, and time.       Results  Lab Results   Component Value Date    PSA 4.4 (H) 03/22/2024    PSA 4.2 03/17/2023    PSA 1.3 02/26/2021     Lab Results   Component Value Date    GLUCOSE 95 04/16/2015    CALCIUM 9.3 10/28/2024     04/16/2015    K 4.1 10/28/2024    CO2 31 10/28/2024     10/28/2024    BUN 12 10/28/2024    CREATININE 0.74 10/28/2024     Lab Results   Component Value Date    WBC 4.79 12/23/2023    HGB 15.9 12/23/2023    HCT 47.5 12/23/2023    MCV 90 12/23/2023     12/23/2023       Office Urine Dip  No results found for this or any previous visit (from the past 1 hour(s)).]    Administrative Statements         "

## 2024-11-08 NOTE — ASSESSMENT & PLAN NOTE
resume tamsulosin 0.4mg qhs; helpful for nocturia and postvoid dribbling  Orders:    tamsulosin (Flomax) 0.4 mg; Take 1 capsule (0.4 mg total) by mouth daily with dinner

## 2024-11-08 NOTE — ASSESSMENT & PLAN NOTE
MRI prostate april 2024: 40cc prostate no nodule/target pirads 2 low risk  no biopsy  continue psa monitoring- due now  psa now then again in six months  Orders:    PSA Total, Diagnostic; Future

## 2024-11-22 ENCOUNTER — APPOINTMENT (OUTPATIENT)
Dept: LAB | Age: 59
End: 2024-11-22
Payer: COMMERCIAL

## 2024-11-22 DIAGNOSIS — R97.20 ELEVATED PSA: ICD-10-CM

## 2024-11-22 LAB
PSA FREE MFR SERPL: 14.97 %
PSA FREE SERPL-MCNC: 0.58 NG/ML
PSA SERPL-MCNC: 3.91 NG/ML (ref 0–4)

## 2024-11-22 PROCEDURE — 84153 ASSAY OF PSA TOTAL: CPT

## 2024-11-22 PROCEDURE — 84154 ASSAY OF PSA FREE: CPT

## 2024-11-22 PROCEDURE — 36415 COLL VENOUS BLD VENIPUNCTURE: CPT

## 2024-11-25 ENCOUNTER — RESULTS FOLLOW-UP (OUTPATIENT)
Dept: OTHER | Facility: HOSPITAL | Age: 59
End: 2024-11-25

## 2024-11-25 DIAGNOSIS — H93.13 TINNITUS OF BOTH EARS: ICD-10-CM

## 2024-11-25 DIAGNOSIS — J32.9 CHRONIC SINUSITIS, UNSPECIFIED LOCATION: ICD-10-CM

## 2024-11-26 RX ORDER — FLUTICASONE PROPIONATE 50 MCG
1 SPRAY, SUSPENSION (ML) NASAL DAILY
Qty: 16 G | Refills: 0 | Status: SHIPPED | OUTPATIENT
Start: 2024-11-26

## 2025-01-22 DIAGNOSIS — E11.9 TYPE 2 DIABETES MELLITUS WITHOUT COMPLICATION, WITHOUT LONG-TERM CURRENT USE OF INSULIN (HCC): ICD-10-CM

## 2025-01-22 RX ORDER — ATORVASTATIN CALCIUM 20 MG/1
20 TABLET, FILM COATED ORAL DAILY
Qty: 90 TABLET | Refills: 0 | Status: SHIPPED | OUTPATIENT
Start: 2025-01-22

## 2025-01-23 LAB
LEFT EYE DIABETIC RETINOPATHY: NORMAL
RIGHT EYE DIABETIC RETINOPATHY: NORMAL

## 2025-01-24 ENCOUNTER — TELEPHONE (OUTPATIENT)
Dept: FAMILY MEDICINE CLINIC | Facility: CLINIC | Age: 60
End: 2025-01-24

## 2025-01-24 NOTE — TELEPHONE ENCOUNTER
first attempt to contact patient. left message to return my call on answering machine in French. Patient needs a physical.

## 2025-01-27 ENCOUNTER — TELEPHONE (OUTPATIENT)
Dept: FAMILY MEDICINE CLINIC | Facility: CLINIC | Age: 60
End: 2025-01-27

## 2025-01-27 NOTE — TELEPHONE ENCOUNTER
Patients spouse left a message requesting a call back to reschedule patients missed appointment. Appointment scheduled for 2/7.

## 2025-01-29 NOTE — TELEPHONE ENCOUNTER
third attempt to contact patient. left message to return my call on answering machine. Letter sent.

## 2025-02-07 ENCOUNTER — OFFICE VISIT (OUTPATIENT)
Dept: FAMILY MEDICINE CLINIC | Facility: CLINIC | Age: 60
End: 2025-02-07

## 2025-02-07 VITALS
HEIGHT: 68 IN | DIASTOLIC BLOOD PRESSURE: 70 MMHG | SYSTOLIC BLOOD PRESSURE: 124 MMHG | TEMPERATURE: 97.8 F | WEIGHT: 145 LBS | OXYGEN SATURATION: 98 % | HEART RATE: 73 BPM | RESPIRATION RATE: 18 BRPM | BODY MASS INDEX: 21.98 KG/M2

## 2025-02-07 DIAGNOSIS — M54.17 LUMBOSACRAL RADICULOPATHY: ICD-10-CM

## 2025-02-07 DIAGNOSIS — E11.9 TYPE 2 DIABETES MELLITUS WITHOUT COMPLICATION, WITHOUT LONG-TERM CURRENT USE OF INSULIN (HCC): Primary | ICD-10-CM
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
## 2025-02-07 PROBLEM — R07.81 RIB PAIN ON LEFT SIDE: Status: RESOLVED | Noted: 2024-03-22 | Resolved: 2025-02-07

## 2025-02-07 LAB
CREAT UR-MCNC: 139 MG/DL
MICROALBUMIN UR-MCNC: <7 MG/L
SL AMB POCT HEMOGLOBIN AIC: 7.4 (ref ?–6.5)

## 2025-02-07 PROCEDURE — 82570 ASSAY OF URINE CREATININE: CPT | Performed by: STUDENT IN AN ORGANIZED HEALTH CARE EDUCATION/TRAINING PROGRAM

## 2025-02-07 PROCEDURE — 83036 HEMOGLOBIN GLYCOSYLATED A1C: CPT

## 2025-02-07 PROCEDURE — 99213 OFFICE O/P EST LOW 20 MIN: CPT

## 2025-02-07 PROCEDURE — 82043 UR ALBUMIN QUANTITATIVE: CPT | Performed by: STUDENT IN AN ORGANIZED HEALTH CARE EDUCATION/TRAINING PROGRAM

## 2025-02-07 RX ORDER — NAPROXEN 250 MG/1
250 TABLET ORAL 2 TIMES DAILY PRN
Qty: 90 TABLET | Refills: 1 | Status: SHIPPED | OUTPATIENT
Start: 2025-02-07 | End: 2025-02-07 | Stop reason: SDUPTHER

## 2025-02-07 RX ORDER — ATORVASTATIN CALCIUM 20 MG/1
20 TABLET, FILM COATED ORAL DAILY
Qty: 90 TABLET | Refills: 1 | Status: SHIPPED | OUTPATIENT
Start: 2025-02-07

## 2025-02-07 RX ORDER — NAPROXEN 250 MG/1
250 TABLET ORAL 2 TIMES DAILY PRN
Qty: 90 TABLET | Refills: 1 | Status: SHIPPED | OUTPATIENT
Start: 2025-02-07

## 2025-02-07 NOTE — ASSESSMENT & PLAN NOTE
Lab Results   Component Value Date    HGBA1C 7.4 (A) 02/07/2025   Diabetes is stable, goal A1c less than 7, adequate control at 7.4  - Getting up-to-date on diabetic screenings  -Refill of medication provided at this visit  - Care gaps contacted regarding his diabetic eye exam that was completed this year  - Follow-up in 2 months for physical and diabetes follow-up  Orders:    POCT hemoglobin A1c    Albumin / creatinine urine ratio; Future    atorvastatin (LIPITOR) 20 mg tablet; Take 1 tablet (20 mg total) by mouth daily    metFORMIN (GLUCOPHAGE) 1000 MG tablet; Take 1 tablet (1,000 mg total) by mouth 2 (two) times a day with meals

## 2025-02-07 NOTE — PROGRESS NOTES
Name: Nahid Soliman      : 1965      MRN: [id number]  Encounter Provider: Elias Schoen, MD  Encounter Date: 2025   Encounter department: [facility] [place]  :  Assessment & Plan  Type 2 diabetes mellitus without complication, without long-term current use of insulin ([facility])    Lab Results   Component Value Date    HGBA1C 7.4 (A) 2025   Diabetes is stable, goal A1c less than 7, adequate control at 7.4  - Getting up-to-date on diabetic screenings  -Refill of medication provided at this visit  - Care gaps contacted regarding his diabetic eye exam that was completed this year  - Follow-up in 2 months for physical and diabetes follow-up  Orders:    POCT hemoglobin A1c    Albumin / creatinine urine ratio; Future    atorvastatin (LIPITOR) 20 mg tablet; Take 1 tablet (20 mg total) by mouth daily    metFORMIN (GLUCOPHAGE) 1000 MG tablet; Take 1 tablet (1,000 mg total) by mouth 2 (two) times a day with meals    Lumbosacral radiculopathy  History consistent with lumbosacral radiculopathy.  No concerning findings on exam today, or red flag symptoms by history.  He is not currently experiencing symptoms and exam was wholly benign.  - Patient declined physical therapy, handout on stretching exercises provided  - Patient to work on core and back strengthening exercises on his own  - Naproxen twice daily up to 2 weeks for symptom recurrence, as needed going forward  Orders:    naproxen (NAPROSYN) 250 mg tablet; Take 1 tablet (250 mg total) by mouth 2 (two) times a day as needed for mild pain (with meals)           History of Present Illness   Patient is a 59-year-old male here for follow-up regarding lower back pain with radiculopathy    Patient reports that the symptoms occurred about 2 weeks ago, they since have resolved.  The pain radiates down his right leg.  He reports his symptoms are worse about 2 hours after starting work.  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  He has not done physical therapy, he is taking Tylenol with mild relief of his symptoms.  He has not tried any NSAIDs.  Patient also declines physical therapy follow-up.  He is currently not having symptoms.    Patient reports getting his diabetic eye exam [facility] only a couple months ago, he assured me that it was a dilated eye exam and everything was normal.    Diabetes  He has type 2 diabetes mellitus. Pertinent negatives for hypoglycemia include no dizziness. Pertinent negatives for diabetes include no fatigue, no polyphagia, no polyuria and no visual change.     Review of Systems   Constitutional:  Negative for fatigue and fever.   Respiratory:  Negative for shortness of breath.    Gastrointestinal:  Negative for abdominal pain, constipation and diarrhea.   Endocrine: Negative for polyphagia and polyuria.   Genitourinary:  Negative for dysuria.   Skin:  Negative for rash.   Neurological:  Negative for dizziness.       Objective   /70 (BP Location: Right arm, Patient Position: Sitting, Cuff Size: Standard)   Pulse 73   Temp 97.8 °F (36.6 °C) (Temporal)   Resp 18   Ht 5' 8\" (1.727 m)   Wt 65.8 kg (145 lb)   SpO2 98%   BMI 22.05 kg/m²      Physical Exam  Constitutional:       Appearance: Normal appearance.   HENT:      Mouth/Throat:      Mouth: Mucous membranes are moist.   Eyes:      Pupils: Pupils are equal, round, and reactive to light.   Cardiovascular:      Rate and Rhythm: Normal rate.   Pulmonary:      Effort: Pulmonary effort is normal.   Musculoskeletal:         General: Normal range of motion.      Cervical back: Normal range of motion.      Lumbar back: No swelling, spasms, tenderness or bony tenderness. Normal range of motion. Negative right straight leg raise test and negative left straight leg raise test.   Skin:     General: Skin is warm.   Neurological:      General: No focal deficit present.      Mental Status: He is alert.   Psychiatric:         Mood and Affect: " Mood normal.         Behavior: Behavior normal.

## 2025-02-09 ENCOUNTER — RESULTS FOLLOW-UP (OUTPATIENT)
Dept: FAMILY MEDICINE CLINIC | Facility: CLINIC | Age: 60
End: 2025-02-09

## 2025-02-10 ENCOUNTER — TELEPHONE (OUTPATIENT)
Dept: ADMINISTRATIVE | Facility: OTHER | Age: 60
End: 2025-02-10

## 2025-02-10 NOTE — TELEPHONE ENCOUNTER
Upon review of the In Basket request and the patient's chart, initial outreach has been made via fax to facility. Please see Contacts section for details.     Thank you  Callie Bryan

## 2025-02-10 NOTE — LETTER
Procedure Request Form: {VBIQAPROCEDURELIST:91047}      Date Requested: 25  Patient: Nahid Soliman  Patient : 1965   Referring Provider: Elias Schoen, MD        Date of Procedure ______________________________       The above patient has informed us that they have completed their   most recent {VBIQAPROCEDURELIST:72213} at your facility. Please complete   this form and attach all corresponding procedure reports/results.    Comments __________________________________________________________  ____________________________________________________________________  ____________________________________________________________________  ____________________________________________________________________    Facility Completing Procedure _________________________________________    Form Completed By (print name) _______________________________________      Signature __________________________________________________________      These reports are needed for  compliance.    Please fax this completed form and a copy of the procedure report to our office located at [address] [place], [place] [id number] as soon as possible to {VBIRIGHTFAXNUMBERS:15821} attention {VBIQANAMES:06144}    We thank you for your assistance in treating our mutual patient.

## 2025-02-10 NOTE — LETTER
Diabetic Eye Exam Form    Date Requested: 02/10/25  Patient: Nahid Soliman  Patient : 1965   Referring Provider: Elias Schoen, MD      DIABETIC Eye Exam Date _______________________________      Type of Exam MUST be documented for Diabetic Eye Exams. Please CHECK ONE.     Retinal Exam       Dilated Retinal Exam       OCT       Optomap-Iris Exam      Fundus Photography       Left Eye - Please check Retinopathy or No Retinopathy        Exam did show retinopathy    Exam did not show retinopathy       Right Eye - Please check Retinopathy or No Retinopathy       Exam did show retinopathy    Exam did not show retinopathy       Comments __________________________________________________________    Practice Providing Exam ______________________________________________    Exam Performed By (print name) _______________________________________      Provider Signature ___________________________________________________      These reports are needed for  compliance.  Please fax this completed form and a copy of the Diabetic Eye Exam report to our office located at [address] [place] [address] as soon as possible via Fax [phone] attention Callie: Phone [phone]  We thank you for your assistance in treating our mutual patient.

## 2025-02-10 NOTE — TELEPHONE ENCOUNTER
----- Message from Elias Schoen, MD sent at 2/7/2025  1:19 PM EST -----  Patient received his diabetic eye exam at [facility].  Hopefully they are able to retrieve these results resolve his care  Thank you!

## 2025-02-10 NOTE — LETTER
Diabetic Eye Exam Form    Date Requested: 25  Patient: Nahid Soliman  Patient : 1965   Referring Provider: Elias Schoen, MD      DIABETIC Eye Exam Date _______________________________      Type of Exam MUST be documented for Diabetic Eye Exams. Please CHECK ONE.     Retinal Exam       Dilated Retinal Exam       OCT       Optomap-Iris Exam      Fundus Photography       Left Eye - Please check Retinopathy or No Retinopathy        Exam did show retinopathy    Exam did not show retinopathy       Right Eye - Please check Retinopathy or No Retinopathy       Exam did show retinopathy    Exam did not show retinopathy       Comments __________________________________________________________    Practice Providing Exam ______________________________________________    Exam Performed By (print name) _______________________________________      Provider Signature ___________________________________________________      These reports are needed for  compliance.  Please fax this completed form and a copy of the Diabetic Eye Exam report to our office located at [address] [place] [address] as soon as possible via Fax [phone] attention Kerissa: Phone [phone]  We thank you for your assistance in treating our mutual patient.

## 2025-02-13 NOTE — TELEPHONE ENCOUNTER
As a follow-up, a second attempt has been made for outreach via telephone call to facility. Please see Contacts section for details.    Thank you  Callie Bryan

## 2025-02-17 NOTE — TELEPHONE ENCOUNTER
As a final attempt, a third outreach has been made via fax to facility. Please see Contacts section for details. This encounter will be closed and completed by end of day. Should we receive the requested information because of previous outreach attempts, the requested patient's chart will be updated appropriately.     Thank you  ALYSE REED MA

## 2025-02-19 NOTE — TELEPHONE ENCOUNTER
Upon review of the In Basket request we were able to locate, review, and update the patient chart as requested for Diabetic Eye Exam.    Any additional questions or concerns should be emailed to the Practice Liaisons via the appropriate education email address, please do not reply via In Basket.    Thank you  Callie Bryan   [facility]

## 2025-04-07 NOTE — LETTER
January 22, 2021     [facility] [address] [place] [address]    Patient:  Raw   YOB: 1965   Date of Visit: 1/21/2021       To Whom It May Concern:   Marian Lilly can return back to work on 1/26/2021   Thank you         Sincerely,        Kam Olmedo PA-C        CC: No Recipients self-care

## 2025-04-23 ENCOUNTER — TELEPHONE (OUTPATIENT)
Dept: UROLOGY | Facility: MEDICAL CENTER | Age: 60
End: 2025-04-23

## 2025-04-23 DIAGNOSIS — R97.20 ELEVATED PSA: Primary | ICD-10-CM

## 2025-04-23 NOTE — TELEPHONE ENCOUNTER
SPOKE WITH THE WIFE AND ALSO PLACED AN ORDER FOR A PSA ON HIS CHART SINCE HE NEEDS ONE BEFORE HIS NEXT APPOINTMENT WITH GALILEO CHUNG

## 2025-05-03 ENCOUNTER — APPOINTMENT (OUTPATIENT)
Dept: LAB | Age: 60
End: 2025-05-03
Payer: COMMERCIAL

## 2025-05-03 DIAGNOSIS — R97.20 ELEVATED PSA: ICD-10-CM

## 2025-05-03 LAB — PSA SERPL-MCNC: 3.62 NG/ML (ref 0–4)

## 2025-05-03 PROCEDURE — 84153 ASSAY OF PSA TOTAL: CPT

## 2025-05-03 PROCEDURE — 36415 COLL VENOUS BLD VENIPUNCTURE: CPT

## 2025-05-09 ENCOUNTER — OFFICE VISIT (OUTPATIENT)
Dept: UROLOGY | Facility: CLINIC | Age: 60
End: 2025-05-09
Payer: COMMERCIAL

## 2025-05-09 VITALS
HEART RATE: 78 BPM | BODY MASS INDEX: 21.74 KG/M2 | DIASTOLIC BLOOD PRESSURE: 78 MMHG | OXYGEN SATURATION: 99 % | WEIGHT: 143 LBS | SYSTOLIC BLOOD PRESSURE: 128 MMHG

## 2025-05-09 DIAGNOSIS — Z12.5 SCREENING FOR PROSTATE CANCER: ICD-10-CM

## 2025-05-09 DIAGNOSIS — N52.9 ERECTILE DYSFUNCTION, UNSPECIFIED ERECTILE DYSFUNCTION TYPE: ICD-10-CM

## 2025-05-09 DIAGNOSIS — R35.1 BENIGN PROSTATIC HYPERPLASIA WITH NOCTURIA: Primary | ICD-10-CM

## 2025-05-09 DIAGNOSIS — N40.1 BENIGN PROSTATIC HYPERPLASIA WITH NOCTURIA: Primary | ICD-10-CM

## 2025-05-09 DIAGNOSIS — Z11.3 SCREEN FOR STD (SEXUALLY TRANSMITTED DISEASE): ICD-10-CM

## 2025-05-09 LAB
SL AMB  POCT GLUCOSE, UA: 500
SL AMB LEUKOCYTE ESTERASE,UA: ABNORMAL
SL AMB POCT BILIRUBIN,UA: ABNORMAL
SL AMB POCT BLOOD,UA: ABNORMAL
SL AMB POCT CLARITY,UA: CLEAR
SL AMB POCT COLOR,UA: YELLOW
SL AMB POCT KETONES,UA: ABNORMAL
SL AMB POCT NITRITE,UA: ABNORMAL
SL AMB POCT PH,UA: 5
SL AMB POCT SPECIFIC GRAVITY,UA: 1.01
SL AMB POCT URINE PROTEIN: ABNORMAL
SL AMB POCT UROBILINOGEN: ABNORMAL

## 2025-05-09 PROCEDURE — 81002 URINALYSIS NONAUTO W/O SCOPE: CPT | Performed by: PHYSICIAN ASSISTANT

## 2025-05-09 PROCEDURE — 87491 CHLMYD TRACH DNA AMP PROBE: CPT | Performed by: PHYSICIAN ASSISTANT

## 2025-05-09 PROCEDURE — 87591 N.GONORRHOEAE DNA AMP PROB: CPT | Performed by: PHYSICIAN ASSISTANT

## 2025-05-09 PROCEDURE — 99213 OFFICE O/P EST LOW 20 MIN: CPT | Performed by: PHYSICIAN ASSISTANT

## 2025-05-09 NOTE — ASSESSMENT & PLAN NOTE
tamsulosin 0.4mg with dinner- tolerates well  he will reduce his coffee intake to before 2pm and stop all fluids 2 hrs before bed to help his nocturia  Orders:    POCT urine dip

## 2025-05-09 NOTE — PROGRESS NOTES
Name: Nahid Soliman      : 1965      MRN: [id number]  Encounter Provider: Waleska Dorsey PA-C  Encounter Date: 2025   Encounter department: [facility] [place] [facility]  :  Assessment & Plan  Benign prostatic hyperplasia with nocturia  tamsulosin 0.4mg with dinner- tolerates well  he will reduce his coffee intake to before 2pm and stop all fluids 2 hrs before bed to help his nocturia  Orders:    POCT urine dip    Screen for STD (sexually transmitted disease)  no discharge  no bleeding no testicular pain- pt requests routine std check  Orders:    Chlamydia/GC amplified DNA by PCR    Erectile dysfunction, unspecified erectile dysfunction type  sildenafil 100mg PRN one hour before sex       Screening for prostate cancer  Lab Results   Component Value Date    PSA 3.620 2025    PSA 3.907 2024    PSA 4.4 (H) 2024     psa back to baseline, resume yearly screenings           History of Present Illness   Nahid Soliman is a 59 y.o. male who presents routine followup BPH doing well on tamsulosin, he wakes a few times a night but admits he drinks coffee in the evening right up until bedtime. He has viagra which is helpful, sometimes feels OK without it but sometimes uses it as a boost. No side effects. PSA up to date. He requests routine STD screen.    Review of Systems   Constitutional: Negative.    Respiratory: Negative.     Cardiovascular: Negative.    Genitourinary:  Negative for decreased urine volume, difficulty urinating, dysuria, flank pain, frequency, hematuria and urgency.   Musculoskeletal: Negative.           Objective   /78 (BP Location: Left arm, Patient Position: Sitting, Cuff Size: Standard)   Pulse 78   Wt 64.9 kg (143 lb)   SpO2 99%   BMI 21.74 kg/m²     Physical Exam  Vitals and nursing note reviewed.   Constitutional:       General: He is not in acute distress.     Appearance: He is well-developed. He is not diaphoretic.   HENT:      Head:  Normocephalic and atraumatic.   Pulmonary:      Effort: Pulmonary effort is normal.   Musculoskeletal:      Right lower leg: No edema.      Left lower leg: No edema.   Skin:     General: Skin is warm.   Neurological:      Mental Status: He is alert and oriented to person, place, and time.           Results   Lab Results   Component Value Date    PSA 3.620 05/03/2025    PSA 3.907 11/22/2024    PSA 4.4 (H) 03/22/2024     Lab Results   Component Value Date    GLUCOSE 95 04/16/2015    CALCIUM 9.3 10/28/2024     04/16/2015    K 4.1 10/28/2024    CO2 31 10/28/2024     10/28/2024    BUN 12 10/28/2024    CREATININE 0.74 10/28/2024     Lab Results   Component Value Date    WBC 4.79 12/23/2023    HGB 15.9 12/23/2023    HCT 47.5 12/23/2023    MCV 90 12/23/2023     12/23/2023       Office Urine Dip  Recent Results (from the past hour)   POCT urine dip    Collection Time: 05/09/25 10:54 AM   Result Value Ref Range    LEUKOCYTE ESTERASE,UA -     NITRITE,UA -     SL AMB POCT UROBILINOGEN -     POCT URINE PROTEIN -      PH,UA 5.0     BLOOD,UA -     SPECIFIC GRAVITY,UA 1.015     KETONES,UA -     BILIRUBIN,UA -     GLUCOSE,       COLOR,UA Yellow     CLARITY,UA Clear

## 2025-05-09 NOTE — ASSESSMENT & PLAN NOTE
Lab Results   Component Value Date    PSA 3.620 05/03/2025    PSA 3.907 11/22/2024    PSA 4.4 (H) 03/22/2024     psa back to baseline, resume yearly screenings

## 2025-05-09 NOTE — ASSESSMENT & PLAN NOTE
no discharge  no bleeding no testicular pain- pt requests routine std check  Orders:    Chlamydia/GC amplified DNA by PCR

## 2025-05-10 LAB
C TRACH DNA SPEC QL NAA+PROBE: NEGATIVE
N GONORRHOEA DNA SPEC QL NAA+PROBE: NEGATIVE

## 2025-05-15 ENCOUNTER — RESULTS FOLLOW-UP (OUTPATIENT)
Dept: UROLOGY | Facility: CLINIC | Age: 60
End: 2025-05-15

## 2025-05-16 NOTE — TELEPHONE ENCOUNTER
----- Message from Waleska Dorsey PA-C sent at 5/15/2025  4:40 PM EDT -----  negative std screen gc/chlamydia  ----- Message -----  From: Ivan Mena MA  Sent: 5/9/2025  10:56 AM EDT  To: Waleska Dorsey PA-C

## 2025-06-08 PROBLEM — Z11.3 SCREEN FOR STD (SEXUALLY TRANSMITTED DISEASE): Status: RESOLVED | Noted: 2025-05-09 | Resolved: 2025-06-08

## 2025-06-08 PROBLEM — Z12.5 SCREENING FOR PROSTATE CANCER: Status: RESOLVED | Noted: 2025-05-09 | Resolved: 2025-06-08

## 2025-07-08 ENCOUNTER — NURSE TRIAGE (OUTPATIENT)
Dept: OTHER | Facility: OTHER | Age: 60
End: 2025-07-08

## 2025-07-08 NOTE — TELEPHONE ENCOUNTER
"FYI, no follow up from office required.       Reason for Disposition   Patient has refills remaining on their prescription    Answer Assessment - Initial Assessment Questions  1. DRUG NAME: \"What medicine do you need to have refilled?\"        Atorvastatin   Metformin     2. REFILLS REMAINING: \"How many refills are remaining?\" (Note: The label on the medicine or pill bottle will show how many refills are remaining. If there are no refills remaining, then a renewal may be needed.)        1 refill for 90 day supply on each     3. EXPIRATION DATE: \"What is the expiration date?\" (Note: The label states when the prescription will , and thus can no longer be refilled.)        N/A    4. PRESCRIBING HCP: \"Who prescribed it?\" Reason: If prescribed by specialist, call should be referred to that group.    Dr. Schoen     Called and spoke to pharmacist to verify that patient has refills remaining on these medications. Pharmacist at [facility] in [place] confirmed that patient has refills for Metformin and Atorvastatin remaining. Patient made aware and provided with pharmacy phone number to contact them to get refills. Patient verbalized understanding.    Protocols used: Medication Refill and Renewal Call-Adult-    "

## 2025-07-08 NOTE — TELEPHONE ENCOUNTER
"Regarding: Urgent Refil  ----- Message from Crystal SANTO sent at 7/8/2025  6:51 PM EDT -----  \" I only have 1 Metformin left, and 1 Atorvastatin.\"    "

## (undated) DEVICE — LUBRICANT SURGILUBE TUBE 4 OZ  FLIP TOP

## (undated) DEVICE — SCD SEQUENTIAL COMPRESSION COMFORT SLEEVE MEDIUM KNEE LENGTH: Brand: KENDALL SCD

## (undated) DEVICE — CATH BAL DIL URETERAL 6FR 4CM  BAL 4MM X-FRC U30

## (undated) DEVICE — INVIEW CLEAR LEGGINGS: Brand: CONVERTORS

## (undated) DEVICE — TUBING SUCTION 5MM X 12 FT

## (undated) DEVICE — BASKET STONE RTRVL 4 WIRE HELICAL

## (undated) DEVICE — GLOVE SRG BIOGEL 7.5

## (undated) DEVICE — UROCATCH BAG

## (undated) DEVICE — PACK TUR

## (undated) DEVICE — SPECIMEN CONTAINER STERILE PEEL PACK

## (undated) DEVICE — 3M™ STERI-STRIP™ COMPOUND BENZOIN TINCTURE 40 BAGS/CARTON 4 CARTONS/CASE C1544: Brand: 3M™ STERI-STRIP™

## (undated) DEVICE — 3000CC GUARDIAN II: Brand: GUARDIAN

## (undated) DEVICE — GUIDEWIRE STRGHT TIP 0.035 IN  SOLO PLUS